# Patient Record
Sex: MALE | Race: WHITE | NOT HISPANIC OR LATINO | Employment: FULL TIME | URBAN - METROPOLITAN AREA
[De-identification: names, ages, dates, MRNs, and addresses within clinical notes are randomized per-mention and may not be internally consistent; named-entity substitution may affect disease eponyms.]

---

## 2017-01-24 ENCOUNTER — ANESTHESIA EVENT (OUTPATIENT)
Dept: ENDOSCOPY | Facility: HOSPITAL | Age: 52
End: 2017-01-24
Payer: COMMERCIAL

## 2017-01-24 ENCOUNTER — ANESTHESIA (OUTPATIENT)
Dept: ENDOSCOPY | Facility: HOSPITAL | Age: 52
End: 2017-01-24
Payer: COMMERCIAL

## 2017-01-24 VITALS — RESPIRATION RATE: 18 BRPM

## 2017-01-24 PROBLEM — Z13.9 SCREENING: Status: ACTIVE | Noted: 2017-01-24

## 2017-01-24 PROCEDURE — 25000003 PHARM REV CODE 250: Performed by: NURSE ANESTHETIST, CERTIFIED REGISTERED

## 2017-01-24 PROCEDURE — D9220A PRA ANESTHESIA: Mod: 33,ANES,, | Performed by: ANESTHESIOLOGY

## 2017-01-24 PROCEDURE — 63600175 PHARM REV CODE 636 W HCPCS: Performed by: NURSE ANESTHETIST, CERTIFIED REGISTERED

## 2017-01-24 PROCEDURE — D9220A PRA ANESTHESIA: Mod: 33,CRNA,, | Performed by: NURSE ANESTHETIST, CERTIFIED REGISTERED

## 2017-01-24 RX ORDER — PROPOFOL 10 MG/ML
VIAL (ML) INTRAVENOUS CONTINUOUS PRN
Status: DISCONTINUED | OUTPATIENT
Start: 2017-01-24 | End: 2017-01-24

## 2017-01-24 RX ORDER — LIDOCAINE HCL/PF 100 MG/5ML
SYRINGE (ML) INTRAVENOUS
Status: DISCONTINUED | OUTPATIENT
Start: 2017-01-24 | End: 2017-01-24

## 2017-01-24 RX ORDER — PROPOFOL 10 MG/ML
VIAL (ML) INTRAVENOUS
Status: DISCONTINUED | OUTPATIENT
Start: 2017-01-24 | End: 2017-01-24

## 2017-01-24 RX ADMIN — PROPOFOL 150 MCG/KG/MIN: 10 INJECTION, EMULSION INTRAVENOUS at 08:01

## 2017-01-24 RX ADMIN — PROPOFOL 100 MG: 10 INJECTION, EMULSION INTRAVENOUS at 08:01

## 2017-01-24 RX ADMIN — LIDOCAINE HYDROCHLORIDE 80 MG: 20 INJECTION, SOLUTION INTRAVENOUS at 08:01

## 2017-01-24 NOTE — ANESTHESIA PREPROCEDURE EVALUATION
01/24/2017  Kaleb Rendon is a 51 y.o., male.    [facility] Anesthesia Evaluation    I have reviewed the Patient Summary Reports.    I have reviewed the Nursing Notes.   I have reviewed the Medications.     Review of Systems  Anesthesia Hx:  No problems with previous Anesthesia  History of prior surgery of interest to airway management or planning: Previous anesthesia: General Denies Family Hx of Anesthesia complications.   Denies Personal Hx of Anesthesia complications.   Social:  Non-Smoker, Social Alcohol Use    Cardiovascular:   Exercise tolerance: good Hypertension        Physical Exam  General:  Well nourished    Airway/Jaw/Neck:  Airway Findings: Mouth Opening: Normal Tongue: Normal  General Airway Assessment: Adult  Mallampati: II  Improves to II with phonation.  TM Distance: Normal, at least 6 cm  Jaw/Neck Findings:  Neck ROM: Normal ROM      Dental:  Dental Findings: In tact   Chest/Lungs:  Chest/Lungs Findings: Clear to auscultation, Normal Respiratory Rate     Heart/Vascular:  Heart Findings: Rate: Normal  Rhythm: Regular Rhythm  Sounds: Normal        Mental Status:  Mental Status Findings:  Cooperative, Alert and Oriented         Anesthesia Plan  Type of Anesthesia, risks & benefits discussed:  Anesthesia Type:  general  Patient's Preference:   Intra-op Monitoring Plan:   Intra-op Monitoring Plan Comments:   Post Op Pain Control Plan:   Post Op Pain Control Plan Comments:   Induction:   IV  Beta Blocker:  Patient is not currently on a Beta-Blocker (No further documentation required).       Informed Consent: Patient understands risks and agrees with Anesthesia plan.  Questions answered. Anesthesia consent signed with patient.  ASA Score: 2     Day of Surgery Review of History & Physical:    H&P update referred to the surgeon.         Ready For Surgery From Anesthesia Perspective.

## 2017-01-24 NOTE — ANESTHESIA POSTPROCEDURE EVALUATION
"Anesthesia Post Evaluation    Patient: Kaleb Rendon    Procedure(s) Performed: Procedure(s) (LRB):  COLONOSCOPY (N/A)    Final Anesthesia Type: general  Patient location during evaluation: GI PACU  Patient participation: Yes- Able to Participate  Level of consciousness: awake and alert  Post-procedure vital signs: reviewed and stable  Pain management: adequate  Airway patency: patent  PONV status at discharge: No PONV  Anesthetic complications: no      Cardiovascular status: blood pressure returned to baseline  Respiratory status: unassisted  Hydration status: euvolemic  Follow-up not needed.        Visit Vitals    /74 (BP Location: Left arm, Patient Position: Lying, BP Method: Automatic)    Pulse (!) 57    Temp 36.5 °C (97.7 °F) (Axillary)    Resp 18    Ht 6' 1" (1.854 m)    Wt 90.7 kg (200 lb)    SpO2 96%    BMI 26.39 kg/m2       Pain/Natalie Score: Pain Assessment Performed: Yes (1/24/2017  9:43 AM)  Presence of Pain: denies (1/24/2017  9:43 AM)  Natalie Score: 10 (1/24/2017  9:43 AM)      "

## 2017-01-24 NOTE — TRANSFER OF CARE
"Anesthesia Transfer of Care Note    Patient: Kaleb Rendon    Procedure(s) Performed: Procedure(s) (LRB):  COLONOSCOPY (N/A)    Patient location: GI    Anesthesia Type: general    Transport from OR: Transported from OR on room air with adequate spontaneous ventilation    Post pain: adequate analgesia    Post assessment: no apparent anesthetic complications and tolerated procedure well    Post vital signs: stable    Level of consciousness: sedated    Nausea/Vomiting: no vomiting    Complications: none          Last vitals:   Visit Vitals    /88 (BP Location: Left arm, Patient Position: Lying, BP Method: Automatic)    Pulse 76    Temp 36.9 °C (98.4 °F) (Oral)    Resp 16    Ht 6' 1" (1.854 m)    Wt 90.7 kg (200 lb)    SpO2 97%    BMI 26.39 kg/m2     "

## 2017-01-24 NOTE — ANESTHESIA RELEASE NOTE
"Anesthesia Release from PACU Note    Patient: Kaleb Rendon    Procedure(s) Performed: Procedure(s) (LRB):  COLONOSCOPY (N/A)    Anesthesia type: general    Post pain: Adequate analgesia    Post assessment: no apparent anesthetic complications, tolerated procedure well and no evidence of recall    Last Vitals:   Visit Vitals    /74 (BP Location: Left arm, Patient Position: Lying, BP Method: Automatic)    Pulse (!) 57    Temp 36.5 °C (97.7 °F) (Axillary)    Resp 18    Ht 6' 1" (1.854 m)    Wt 90.7 kg (200 lb)    SpO2 96%    BMI 26.39 kg/m2       Post vital signs: stable    Level of consciousness: awake, alert  and oriented    Nausea/Vomiting: no nausea/no vomiting    Complications: none    Airway Patency: patent    Respiratory: unassisted    Cardiovascular: stable and blood pressure at baseline    Hydration: euvolemic  "

## 2018-03-13 DIAGNOSIS — Z00.00 ROUTINE GENERAL MEDICAL EXAMINATION AT A HEALTH CARE FACILITY: Primary | ICD-10-CM

## 2018-03-29 ENCOUNTER — CLINICAL SUPPORT (OUTPATIENT)
Dept: INTERNAL MEDICINE | Facility: CLINIC | Age: 53
End: 2018-03-29
Attending: INTERNAL MEDICINE

## 2018-03-29 ENCOUNTER — OFFICE VISIT (OUTPATIENT)
Dept: PULMONOLOGY | Facility: CLINIC | Age: 53
End: 2018-03-29

## 2018-03-29 ENCOUNTER — CLINICAL SUPPORT (OUTPATIENT)
Dept: INTERNAL MEDICINE | Facility: CLINIC | Age: 53
End: 2018-03-29

## 2018-03-29 ENCOUNTER — HOSPITAL ENCOUNTER (OUTPATIENT)
Dept: RADIOLOGY | Facility: HOSPITAL | Age: 53
Discharge: HOME | End: 2018-03-29
Attending: INTERNAL MEDICINE

## 2018-03-29 ENCOUNTER — HOSPITAL ENCOUNTER (OUTPATIENT)
Dept: CARDIOLOGY | Facility: CLINIC | Age: 53
Discharge: HOME | End: 2018-03-29
Attending: INTERNAL MEDICINE

## 2018-03-29 VITALS
WEIGHT: 207.38 LBS | HEART RATE: 76 BPM | HEIGHT: 73 IN | SYSTOLIC BLOOD PRESSURE: 132 MMHG | DIASTOLIC BLOOD PRESSURE: 77 MMHG | BODY MASS INDEX: 27.49 KG/M2

## 2018-03-29 DIAGNOSIS — Z00.00 ROUTINE GENERAL MEDICAL EXAMINATION AT A HEALTH CARE FACILITY: ICD-10-CM

## 2018-03-29 DIAGNOSIS — Z00.00 ANNUAL PHYSICAL EXAM: Primary | ICD-10-CM

## 2018-03-29 DIAGNOSIS — Z00.00 ROUTINE GENERAL MEDICAL EXAMINATION AT A HEALTH CARE FACILITY: Primary | ICD-10-CM

## 2018-03-29 LAB
ALBUMIN SERPL BCP-MCNC: 4.3 G/DL
ALP SERPL-CCNC: 77 U/L
ALT SERPL W/O P-5'-P-CCNC: 21 U/L
ANION GAP SERPL CALC-SCNC: 8 MMOL/L
AST SERPL-CCNC: 16 U/L
BILIRUB SERPL-MCNC: 0.8 MG/DL
BUN SERPL-MCNC: 11 MG/DL
CALCIUM SERPL-MCNC: 9.5 MG/DL
CHLORIDE SERPL-SCNC: 106 MMOL/L
CHOLEST SERPL-MCNC: 206 MG/DL
CHOLEST/HDLC SERPL: 6.6 {RATIO}
CO2 SERPL-SCNC: 25 MMOL/L
COMPLEXED PSA SERPL-MCNC: 0.99 NG/ML
CREAT SERPL-MCNC: 1.1 MG/DL
DIASTOLIC DYSFUNCTION: NO
ERYTHROCYTE [DISTWIDTH] IN BLOOD BY AUTOMATED COUNT: 13.4 %
EST. GFR  (AFRICAN AMERICAN): >60 ML/MIN/1.73 M^2
EST. GFR  (NON AFRICAN AMERICAN): >60 ML/MIN/1.73 M^2
ESTIMATED AVG GLUCOSE: 108 MG/DL
GLUCOSE SERPL-MCNC: 106 MG/DL
HBA1C MFR BLD HPLC: 5.4 %
HCT VFR BLD AUTO: 45.6 %
HCV AB SERPL QL IA: NEGATIVE
HDLC SERPL-MCNC: 31 MG/DL
HDLC SERPL: 15 %
HGB BLD-MCNC: 14.9 G/DL
LDLC SERPL CALC-MCNC: 129.8 MG/DL
MCH RBC QN AUTO: 29.2 PG
MCHC RBC AUTO-ENTMCNC: 32.7 G/DL
MCV RBC AUTO: 89 FL
NONHDLC SERPL-MCNC: 175 MG/DL
PLATELET # BLD AUTO: 305 K/UL
PMV BLD AUTO: 10.3 FL
POTASSIUM SERPL-SCNC: 4.7 MMOL/L
PROT SERPL-MCNC: 7.5 G/DL
RBC # BLD AUTO: 5.1 M/UL
SODIUM SERPL-SCNC: 139 MMOL/L
TRIGL SERPL-MCNC: 226 MG/DL
TSH SERPL DL<=0.005 MIU/L-ACNC: 2.54 UIU/ML
WBC # BLD AUTO: 5.45 K/UL

## 2018-03-29 PROCEDURE — 99386 PREV VISIT NEW AGE 40-64: CPT | Mod: S$GLB,,, | Performed by: INTERNAL MEDICINE

## 2018-03-29 PROCEDURE — 71046 X-RAY EXAM CHEST 2 VIEWS: CPT | Mod: TC,FY

## 2018-03-29 PROCEDURE — 97750 PHYSICAL PERFORMANCE TEST: CPT | Mod: S$GLB,,, | Performed by: INTERNAL MEDICINE

## 2018-03-29 PROCEDURE — 86803 HEPATITIS C AB TEST: CPT

## 2018-03-29 PROCEDURE — 99999 PR PBB SHADOW E&M-EST. PATIENT-LVL II: CPT | Mod: PBBFAC,,, | Performed by: INTERNAL MEDICINE

## 2018-03-29 PROCEDURE — 71046 X-RAY EXAM CHEST 2 VIEWS: CPT | Mod: 26,,, | Performed by: RADIOLOGY

## 2018-03-29 PROCEDURE — 83036 HEMOGLOBIN GLYCOSYLATED A1C: CPT

## 2018-03-29 PROCEDURE — 80053 COMPREHEN METABOLIC PANEL: CPT

## 2018-03-29 PROCEDURE — 85027 COMPLETE CBC AUTOMATED: CPT

## 2018-03-29 PROCEDURE — 80061 LIPID PANEL: CPT

## 2018-03-29 PROCEDURE — 84153 ASSAY OF PSA TOTAL: CPT

## 2018-03-29 PROCEDURE — 84443 ASSAY THYROID STIM HORMONE: CPT

## 2018-03-29 PROCEDURE — 97802 MEDICAL NUTRITION INDIV IN: CPT | Mod: S$GLB,,, | Performed by: INTERNAL MEDICINE

## 2018-03-29 PROCEDURE — 93015 CV STRESS TEST SUPVJ I&R: CPT | Mod: ,,, | Performed by: INTERNAL MEDICINE

## 2018-03-29 RX ORDER — AMLODIPINE AND BENAZEPRIL HYDROCHLORIDE 5; 10 MG/1; MG/1
1 CAPSULE ORAL DAILY
Refills: 11 | COMMUNITY
Start: 2018-02-09

## 2018-03-29 NOTE — PROGRESS NOTES
Subjective:       Patient ID: Kaleb Rendon is a 52 y.o. male.    Chief Complaint: No chief complaint on file.    HPI   Patient reported history of hypertension controlled with medication. Patient has reported no previous history of pulmonary disease.     Physical Limitations:   - None   - During curl-ups patient stated that he had a hernia 10 years ago and was uncomfortable completing the test. Patient stopped after 7 curl ups.    Current Exercise Routine:   - Runs 2 miles 3-4 days per week   - Light weights 3-4 days per week    No abdominal exercises performed because fear of another hernia. It was explained how important strengthening the core is. I recommended performing body weight abdominal exercises 2 days per week for 10-15 repetitions. I explained this should not cause another hernia.     Review of Systems    Objective:      The fitness evaluation results are as follows:    D.O.S. 3/29/2018   Height (in): 73   Weight (lbs): 206   BMI: 27.3545002   Body Fat (%): 31.22   Waist (cm): 102   Hip (cm): 107   WHR: 0.95   RBP (mmHg): 116/78   RHR (bpm): 64    Strength R (lbs)t: 89    Strength Lt (lbs): 91.6975424   Push-up Assessment: 10   Curl-up Assessment: 7   Flexibility Testing (cm): 43   REE (kcals): 1850     Physical Exam    Assessment:      Age/Gender Stratified Assessment:     Resting BP: Within Testing Limits   Body Fat %: Poor   WHR Risk Factor: Low Risk    Strength R: Below Average    Strength L: Average   Upper Body Endurance: Good   Abdominal Endurance: Well Below Average   Lower body Flexibility: Excellent     1. Routine general medical examination at a health care facility        Plan:    Patient should increase exercise routine to reach below guidelines and improve fitness level.    Recommended Fitness Guidelines:   - 150 minutes of moderate intensity aerobic exercise each week OR 75 minutes of vigorous    - 2-4 days per week of resistance training for each muscle group   -  Daily stretching

## 2018-03-29 NOTE — LETTER
March 29, 2018    Kaleb Rendon  [address] [id number]             [facility]  [facility]  [place] [address]  Phone: [phone] Dear Mr. Rendon:    Thank you for allowing me to serve you and perform your [facility] exam on 3/29/2018. This letter will serve as a brief summary of the physical findings and laboratory/studies performed and recommendations at this time.Except for your blood lipid panel, this is a normal exam. This can best be addressed with diet management.         If you have any questions or concerns, please don't hesitate to call.    Sincerely,        Harry Riley MD

## 2018-03-29 NOTE — PROGRESS NOTES
Nutrition Assessment  This is a general nutrition consultation as per the client provider's insurance provider.    Client name:  Kaleb Rendon  :  1965  Age:  52 y.o.  Gender:  male    FMH: Mother, grandmother and great grandmother - elevated triglycerides  Client states:  He has been here to  previously, however it has been some time ago. Had his blood work done 2 weeks ago and is aware that his Triglyceride level is high, HDL is low, LDL is high and Cholesterol was 200 which is a good number for him. Has a strong family history of elevated Triglycerides and wishes to stay away from Cholesterol medications. His wife is a Pharmacist and has educated him on the possible side effects and she has also done research on fish oil supplementation. He admits to have improved his food choices by limiting sweets and alcohol, however on the other hand his motivation to exercise is to be able to eat. He is honest and has plans for Good Friday to have thin fried Catfish at [facility] which has been a family tradition for years. Has a history of HTN and takes daily meds and is well controlled. A fitness center at his work is available, and he exercise 3/wk on the treadmill and although he presently does not have any joint issues, is interested in protecting them for the future, and his tried other machines. He dislikes the elliptical and StairMaster and pool is not available, so he has chosen the stationary bike to alternate with the treadmill. Today he is interested in learning what he can do to further lower his lipid numbers and to lose wt. By next visit.      Past Medical History:   Diagnosis Date    Colon polyp     Hypertension        Social History    Marital status:    Employment:  [employer]   Social History   Substance Use Topics    Smoking status: Never Smoker    Smokeless tobacco: Never Used    Alcohol use Yes      Comment: social         Current medications:  has a current  "medication list which includes the following prescription(s): amlodipine-benazepril 5-10 mg.  Vitamins, minerals, and/or supplements:  none   Food allergies or intolerances:  none     Food History  Less sweets, less ETOH, nuts 1x/wk  Bread or starch at each meal with added butter  Dislikes: all cold water fish, likes fried catfish or grilled gulf fish in butter    Exercise History:  3x/wk resistance training at Fitness center at work + 25 minutes on treadmill    Lab Reports   Total Cholesterol:  206    Triglycerides:  226  HDL:  31  LDL:  129.8   Glucose:  106  HbA1c:  5.4  BP:  116/78     Weight History  Height:  6'1"     Weight:  206  BMI:  27.24  % Body Fat:  31.22    Diagnosis  RMR (Method:  Body Boundary):  1850 kcal  Activity Factor:  1.3  JOSE L:  2405 - 500 = 1905    Altered nutrition laboratory values related to improper and imbalanced food choices as evidenced by Chol: 206 and Triglyceride: 226.    Intervention    Goals:  1.  Goal wt: 195#  2.  Fish oil supplement daily  3.  Cook with 1/2 butter 1/2 olive oil  4.  1/4 cup nuts daily  5.  25% improvement in Chol, Trig and LDL    Discussed lipid trending from last visit 7 years ago with today's values and all were improved. Explained differences and sources of saturated, mono and polyunsaturated fats via the "Choosing Heart Healthy Fat" handout. Provided and explained how to read label for trans and saturated fat per serving and daily intake of each that is recommended. Encouraged a 50% reduction in saturated fat by reducing butter in cooking  And adding olive oil and client agreed. Reviewed the advantages of nuts, and portion sizes of each. Also agrees to consume nuts daily. Explained the difference between Glucose and HAIC and also shows improvement. Introduced client to [facility] vitamins, ordering process and the ratio of EPA to DHA that is most beneficial. Encouraged a trial of Sunbutter and infused olive oils.    Handouts provided:  Meal Planning " "Guide  Restaurant Guide  Eat Fit Shopping List  Eat Fit [place]  Fast Food Guide  Vitamin/Mineral Guide  AND - "Choosing Heart Healthy Fats"    Monitoring/Evaluation    Monitor the following:  Weight  BMI  % Body Fat  Caloric intake  Labs:  Lipids    Follow Up Plan:  Follow up with client in 1-2 years  "

## 2018-03-29 NOTE — PROGRESS NOTES
Subjective:       Patient ID: Kaleb Rendon is a 52 y.o. male.    Chief Complaint: No chief complaint on file.    HPI  51 yo  at [employer] comes for his periodic health exam. His last visit was in 2012 and he has had no significant medical encounters in that time. Had a screening colonoscope in Jan. 2017, Family relative had colon cancer and he is screened every five years, Had large mouth diverticula otherwise normal. No medical complaints today. Exercises several times a week, jogs on a treadmill for 2 miles. No chest pain or discomfort.   Review of Systems   Constitutional: Negative.    HENT: Negative.    Eyes: Negative.    Respiratory: Negative.    Cardiovascular: Negative.    Gastrointestinal: Negative.         Family hx of colon cancer  Gets periodic  Screening colonoscopies.    Genitourinary: Negative.    Musculoskeletal: Negative.    Skin: Negative.    Neurological: Negative.    Psychiatric/Behavioral: Negative.    All other systems reviewed and are negative.      Objective:      Physical Exam   Constitutional: He is oriented to person, place, and time. He appears well-developed and well-nourished.   HENT:   Head: Normocephalic and atraumatic.   Right Ear: External ear normal.   Left Ear: External ear normal.   Eyes: Conjunctivae and EOM are normal. Pupils are equal, round, and reactive to light.   Neck: Normal range of motion. Neck supple.   Cardiovascular: Normal rate, regular rhythm and normal heart sounds.    Pulmonary/Chest: Effort normal and breath sounds normal.   Peak flow 600 l/min   Abdominal: Soft. Bowel sounds are normal.   Musculoskeletal: Normal range of motion.   Neurological: He is alert and oriented to person, place, and time. He has normal reflexes.   Skin: Skin is warm and dry.   Psychiatric: He has a normal mood and affect. His behavior is normal. Judgment and thought content normal.       Assessment:       No diagnosis found.    Plan:        Labs; Elevated triglycerides: 226 and  mild elevation of cholesterol: 206 with normal LDL, medication not indicated.  All other parameters are normal.  Chest x-ray is clear and Stress  EKG is negative for ischemia and he denies any chest discomfort with exertion. IMP: Type II-B hyperlipidemia to be controlled with diet.

## 2018-09-28 ENCOUNTER — TELEPHONE (OUTPATIENT)
Dept: SPORTS MEDICINE | Facility: CLINIC | Age: 53
End: 2018-09-28

## 2018-09-28 ENCOUNTER — OFFICE VISIT (OUTPATIENT)
Dept: SPORTS MEDICINE | Facility: CLINIC | Age: 53
End: 2018-09-28
Payer: COMMERCIAL

## 2018-09-28 ENCOUNTER — HOSPITAL ENCOUNTER (OUTPATIENT)
Dept: RADIOLOGY | Facility: HOSPITAL | Age: 53
Discharge: HOME | End: 2018-09-28
Attending: NEUROMUSCULOSKELETAL MEDICINE & OMM
Payer: COMMERCIAL

## 2018-09-28 VITALS — HEART RATE: 83 BPM | SYSTOLIC BLOOD PRESSURE: 118 MMHG | DIASTOLIC BLOOD PRESSURE: 75 MMHG

## 2018-09-28 DIAGNOSIS — M25.511 ACUTE PAIN OF RIGHT SHOULDER: Primary | ICD-10-CM

## 2018-09-28 DIAGNOSIS — M25.611 DECREASED ROM OF RIGHT SHOULDER: ICD-10-CM

## 2018-09-28 DIAGNOSIS — S46.001A UNSPECIFIED INJURY OF MUSCLE(S) AND TENDON(S) OF THE ROTATOR CUFF OF RIGHT SHOULDER, INITIAL ENCOUNTER: ICD-10-CM

## 2018-09-28 DIAGNOSIS — M25.511 RIGHT SHOULDER PAIN, UNSPECIFIED CHRONICITY: ICD-10-CM

## 2018-09-28 PROCEDURE — 99204 OFFICE O/P NEW MOD 45 MIN: CPT | Mod: S$GLB,,, | Performed by: NEUROMUSCULOSKELETAL MEDICINE & OMM

## 2018-09-28 PROCEDURE — 73030 X-RAY EXAM OF SHOULDER: CPT | Mod: TC,FY,PO,RT

## 2018-09-28 PROCEDURE — 3078F DIAST BP <80 MM HG: CPT | Mod: CPTII,S$GLB,, | Performed by: NEUROMUSCULOSKELETAL MEDICINE & OMM

## 2018-09-28 PROCEDURE — 3074F SYST BP LT 130 MM HG: CPT | Mod: CPTII,S$GLB,, | Performed by: NEUROMUSCULOSKELETAL MEDICINE & OMM

## 2018-09-28 PROCEDURE — 73030 X-RAY EXAM OF SHOULDER: CPT | Mod: 26,RT,, | Performed by: RADIOLOGY

## 2018-09-28 PROCEDURE — 99999 PR PBB SHADOW E&M-EST. PATIENT-LVL III: CPT | Mod: PBBFAC,,, | Performed by: NEUROMUSCULOSKELETAL MEDICINE & OMM

## 2018-09-28 NOTE — PROGRESS NOTES
Subjective:     Kaleb Rendon Jr.     Chief Complaint   Patient presents with    Right Shoulder - Pain       HPI    Kaleb is a 53 y.o. male coming in today for right shoulder pain that began 3 week(s) ago. Pt. describes the pain as a 4/10 dull pain that does not radiate. There was not a fall/injury/ or trauma associated with the onset of symptoms. He was starting his  when he felt immediate pain in his shoulder. Pt denies hearing/feeling a pop. The pain is better with rest and worse with reaching overhead, tucking in shirt, reaching. Pt notes loss of overhead range of motion. Pt. Denies any other musculoskeletal complaints at this time. Pt has a sedentary computer job, working as a .     Joint instability? no  Mechanical locking/clicking? no  Affecting ADL's? Yes-reaching overhead  Affecting sleep? No     Review of Systems   Constitutional: Negative for chills and fever.   HENT: Negative for hearing loss and tinnitus.    Eyes: Negative for blurred vision and photophobia.   Respiratory: Negative for cough and shortness of breath.    Cardiovascular: Negative for chest pain and leg swelling.   Gastrointestinal: Negative for abdominal pain, heartburn, nausea and vomiting.   Genitourinary: Negative for dysuria and hematuria.   Musculoskeletal: Positive for joint pain and myalgias. Negative for back pain, falls and neck pain.   Skin: Negative for rash.   Neurological: Negative for dizziness, tingling, focal weakness, weakness and headaches.   Endo/Heme/Allergies: Negative for environmental allergies. Does not bruise/bleed easily.   Psychiatric/Behavioral: Negative for depression. The patient is not nervous/anxious.        PAST MEDICAL HISTORY:   Past Medical History:   Diagnosis Date    Colon polyp     Hypertension      PAST SURGICAL HISTORY:   Past Surgical History:   Procedure Laterality Date    COLONOSCOPY      COLONOSCOPY N/A 1/24/2017    Procedure: COLONOSCOPY;  Surgeon: DIETER Goldsmith MD;   Location: [facility] (4TH FLR);  Service: Endoscopy;  Laterality: N/A;    COLONOSCOPY N/A 1/24/2017    Performed by DIETER Goldsmith MD at [facility] (4TH FLR)    COLONOSCOPY W/ POLYPECTOMY      HERNIA REPAIR       FAMILY HISTORY:   Family History   Problem Relation Age of Onset    Cancer Mother 65        colon cancer    Hyperlipidemia Mother     Diabetes Father     Glaucoma Father     Macular degeneration Father      SOCIAL HISTORY:   Social History     Socioeconomic History    Marital status:      Spouse name: Not on file    Number of children: Not on file    Years of education: Not on file    Highest education level: Not on file   Social Needs    Financial resource strain: Not on file    Food insecurity - worry: Not on file    Food insecurity - inability: Not on file    Transportation needs - medical: Not on file    Transportation needs - non-medical: Not on file   Occupational History    Not on file   Tobacco Use    Smoking status: Never Smoker    Smokeless tobacco: Never Used   Substance and Sexual Activity    Alcohol use: Yes     Comment: social     Drug use: No    Sexual activity: Yes     Partners: Female   Other Topics Concern    Not on file   Social History Narrative    Not on file       MEDICATIONS:   Current Outpatient Medications:     amlodipine-benazepril 5-10 mg (LOTREL) 5-10 mg per capsule, Take 1 capsule by mouth once daily., Disp: , Rfl: 11  ALLERGIES: Review of patient's allergies indicates:  No Known Allergies    Objective:     VITAL SIGNS: /75   Pulse 83    General    Nursing note and vitals reviewed.  Constitutional: He is oriented to person, place, and time. He appears well-developed and well-nourished.   HENT:   Head: Normocephalic and atraumatic.   no nasal discharge, no external ear redness or discharge   Eyes:   EOM is full and smooth  No eye redness or discharge   Neck: Neck supple. No tracheal deviation present.   Cardiovascular: Normal rate.    2+  Radial pulse bilaterally  2+ Dorsalis Pedis pulse bilaterally  No LE edema appreciated   Pulmonary/Chest: Effort normal. No respiratory distress.   Abdominal: He exhibits no distension.   No rigidity   Neurological: He is alert and oriented to person, place, and time. He exhibits normal muscle tone. Coordination normal.   See details below   Psychiatric: He has a normal mood and affect. His behavior is normal.             MUSCULOSKELETAL EXAM  Shoulder: right SHOULDER  The affected shoulder is compared to the contralateral shoulder.    Observation:    CERVICAL SPINE  Anterior head carriage. Increased thoracic kyphosis.      SHOULDER  No ecchymosis, edema, or erythema throughout the shoulder girdle.  No sternal, clavicular, or acromial deformities bilaterally.  No atrophy of the pectorals, deltoids, supraspinatus, infraspinatus, or biceps bilaterally.  Right shoulder slightly elevated 2/2 to muscular gaurding shoulders bilaterally.    ROM:  CERVICAL SPINE  Full AROM in flexion, extension, sidebending, and rotation without tenderness.    SHOULDER(* = with pain)  Active flexion to 180° on left and 130° on right*. Right Passive ROM met with pain at 140°  Active abduction to 180° on left and 90° on right *. Right Passive ROM met with muscle guarding and pain at 90°  Active internal rotation to T7 on left and L5 on right*.    Active external rotation to T4 on left and C7 on right*.    No scapular dyskinesia or winging.    Tenderness:  No tenderness at the AC joint  No tenderness over the clavicle   No tenderness over biceps tendon or bicipital groove  No tenderness over subacromial space    Strength Testing (* = with pain):  Deltoid - 5/5 on left and 4/5* on right  Biceps - 5/5 on left and 5/5 on right  Triceps - 5/5 on left and 5/5 on right  Wrist extension - 5/5 on left and 5/5 on right  Wrist flexion - 5/5 on left and 5/5 on right   - 5/5 on left and 5/5 on right  Finger extension - 5/5 on left and 5/5 on  right  Finger abduction - 5/5 on left and 5/5 on right  Scaption- 4/5* on right, 5/5 on left  IR- 4/5* on right, 5/5 on left  External rotation- 3/5* on right, 5/5 on left    Special Tests:  Empty can test - postive  Full can test - positive  Bear hug test - negative  Resisted internal rotation - positive  Resisted external rotation - positive    Neer's test - unable to perform 2/2 to limited forward flexion   Hawkin's-Cecilio test - positive  Cross-arm test- negative    OKeiths test - negative for deep pain     Sulcus sign - none  AP load and shift laxity - none    Speed's test - negative  Yergason's test - negative    Neurovascular Exam:  Spurlings test - negative bialterally  Lhermittes test - negative  2+ radial pulses bilaterally  Sensation intact to light touch in the distal median, radial, and ulnar nerve distributions bilaterally.  2+/4 reflexes at triceps, biceps, and brachioradialis  Capillary refill intact <2 seconds in all digits bilaterally    IMAGIN. X-ray ordered due to right shoulder. (AP, scapular Y, and axillary views) taken today.   2. X-ray images were reviewed personally by me and then directly with patient.  3. FINDINGS: X-ray images obtained demonstrate no cortical irregularities, sclerosis, osteophyte formation, or subchonral cysts. There is no joint space narrowing. No fractures or dislocations appreciated  4. IMPRESSION: No pathology or irregularities appreciated.     Assessment:      Encounter Diagnoses   Name Primary?    Acute pain of right shoulder Yes    Unspecified injury of muscle(s) and tendon(s) of the rotator cuff of right shoulder, initial encounter     Decreased ROM of right shoulder           Plan:     1. Concern for Right RTC tear on exam.  X-ray images of right shoulder. (AP, scapular Y, and axillary views) taken today. Images showed no abnormalities and images were personally reviewed with patient. MRI of R shoulder ordered for further evaluation.     2. HEP: Pt.  Given gentle Right shoulder ROM exercises (wall abduction and forward flexion, active internal and external rotation with arm by his side) to do 2-3 times a day - ending ROM at stretch, avoiding pain.     3. Pain control: OTC  ibuprofen prn, ice up to 20 minutes at a time following HEP    4. Follow-up following MRI for reevaluation and review of results.     5. Patient agreeable to today's plan and all questions were answered

## 2018-10-08 ENCOUNTER — HOSPITAL ENCOUNTER (OUTPATIENT)
Dept: RADIOLOGY | Facility: HOSPITAL | Age: 53
Discharge: HOME | End: 2018-10-08
Attending: NEUROMUSCULOSKELETAL MEDICINE & OMM
Payer: COMMERCIAL

## 2018-10-08 DIAGNOSIS — M25.511 ACUTE PAIN OF RIGHT SHOULDER: ICD-10-CM

## 2018-10-08 PROCEDURE — 73221 MRI JOINT UPR EXTREM W/O DYE: CPT | Mod: TC,RT

## 2018-10-08 PROCEDURE — 73221 MRI JOINT UPR EXTREM W/O DYE: CPT | Mod: 26,RT,, | Performed by: RADIOLOGY

## 2018-10-09 ENCOUNTER — OFFICE VISIT (OUTPATIENT)
Dept: SPORTS MEDICINE | Facility: CLINIC | Age: 53
End: 2018-10-09
Payer: COMMERCIAL

## 2018-10-09 VITALS
WEIGHT: 207.38 LBS | HEIGHT: 73 IN | BODY MASS INDEX: 27.49 KG/M2 | HEART RATE: 72 BPM | DIASTOLIC BLOOD PRESSURE: 89 MMHG | SYSTOLIC BLOOD PRESSURE: 138 MMHG

## 2018-10-09 DIAGNOSIS — M75.51 SUBACROMIAL BURSITIS OF RIGHT SHOULDER JOINT: ICD-10-CM

## 2018-10-09 DIAGNOSIS — M25.511 RIGHT SHOULDER PAIN, UNSPECIFIED CHRONICITY: Primary | ICD-10-CM

## 2018-10-09 DIAGNOSIS — M75.01 ADHESIVE CAPSULITIS OF RIGHT SHOULDER: ICD-10-CM

## 2018-10-09 DIAGNOSIS — M67.813 TENDINOSIS OF RIGHT SHOULDER: ICD-10-CM

## 2018-10-09 PROCEDURE — 99214 OFFICE O/P EST MOD 30 MIN: CPT | Mod: 25,S$GLB,, | Performed by: NEUROMUSCULOSKELETAL MEDICINE & OMM

## 2018-10-09 PROCEDURE — 3079F DIAST BP 80-89 MM HG: CPT | Mod: CPTII,S$GLB,, | Performed by: NEUROMUSCULOSKELETAL MEDICINE & OMM

## 2018-10-09 PROCEDURE — 3075F SYST BP GE 130 - 139MM HG: CPT | Mod: CPTII,S$GLB,, | Performed by: NEUROMUSCULOSKELETAL MEDICINE & OMM

## 2018-10-09 PROCEDURE — 20611 DRAIN/INJ JOINT/BURSA W/US: CPT | Mod: RT,S$GLB,, | Performed by: NEUROMUSCULOSKELETAL MEDICINE & OMM

## 2018-10-09 PROCEDURE — 3008F BODY MASS INDEX DOCD: CPT | Mod: CPTII,S$GLB,, | Performed by: NEUROMUSCULOSKELETAL MEDICINE & OMM

## 2018-10-09 PROCEDURE — 99999 PR PBB SHADOW E&M-EST. PATIENT-LVL III: CPT | Mod: PBBFAC,,, | Performed by: NEUROMUSCULOSKELETAL MEDICINE & OMM

## 2018-10-09 RX ORDER — TRIAMCINOLONE ACETONIDE 40 MG/ML
40 INJECTION, SUSPENSION INTRA-ARTICULAR; INTRAMUSCULAR
Status: COMPLETED | OUTPATIENT
Start: 2018-10-09 | End: 2018-10-09

## 2018-10-09 RX ADMIN — TRIAMCINOLONE ACETONIDE 40 MG: 40 INJECTION, SUSPENSION INTRA-ARTICULAR; INTRAMUSCULAR at 02:10

## 2018-10-09 NOTE — PROGRESS NOTES
"Subjective:     Kaleb Rendon Jr.    Chief Complaint   Patient presents with    Right Shoulder - Follow-up       HPI    Kaleb is a 53 y.o. male coming in today for right shoulder pain. He is here for his MRI results. Since last visit the pain has remained unchanged. Pt. describes the pain as a 0/10 at rest, 3-4/10 with certain movements sharp pain that does not radiate. There has not been any new a fall/injury/ or traumas since last visit.  He is still having difficulty with overhead motion and ROM stiffness. Pt. denies any new musculoskeletal complaints at this time.     Joint instability? no  Mechanical locking/clicking? no  Affecting ADL's? Yes- overhead activities  Affecting sleep? no    Review of Systems   Constitutional: Negative for chills and fever.   Musculoskeletal: Positive for joint pain. Negative for back pain, falls, myalgias and neck pain.   Neurological: Negative for dizziness, tingling, focal weakness, weakness and headaches.       PAST MEDICAL HISTORY:   Past Medical History:   Diagnosis Date    Colon polyp     Hypertension      PAST SURGICAL HISTORY:   Past Surgical History:   Procedure Laterality Date    COLONOSCOPY      COLONOSCOPY N/A 1/24/2017    Procedure: COLONOSCOPY;  Surgeon: DIETER Goldsmith MD;  Location: [facility] ([address] [place] [address]);  Service: Endoscopy;  Laterality: N/A;    COLONOSCOPY N/A 1/24/2017    Performed by DIETER Goldsmith MD at [facility] ([address] [place] [address])    COLONOSCOPY W/ POLYPECTOMY      HERNIA REPAIR         MEDICATIONS:   Current Outpatient Medications:     amlodipine-benazepril 5-10 mg (LOTREL) 5-10 mg per capsule, Take 1 capsule by mouth once daily., Disp: , Rfl: 11    Current Facility-Administered Medications:     triamcinolone acetonide injection 40 mg, 40 mg, Intra-articular, 1 time in Clinic/HOD, Morelia Truong DO  ALLERGIES: Review of patient's allergies indicates:  No Known Allergies      Objective:     VITAL SIGNS: /89   Pulse 72   Ht 6' 1" (1.854 m)   Wt " 94.1 kg (207 lb 6.4 oz)   BMI 27.36 kg/m²    General    Vitals reviewed.  Constitutional: He is oriented to person, place, and time. He appears well-developed and well-nourished.   Neurological: He is alert and oriented to person, place, and time.   Psychiatric: He has a normal mood and affect. His behavior is normal.               MUSCULOSKELETAL EXAM  Shoulder: right SHOULDER  The affected shoulder is compared to the contralateral shoulder.    Observation:    CERVICAL SPINE  Anterior head carriage. Increased thoracic kyphosis.    SHOULDER  No ecchymosis, edema, or erythema throughout the shoulder girdle.  No sternal, clavicular, or acromial deformities bilaterally.  No atrophy of the pectorals, deltoids, supraspinatus, infraspinatus, or biceps bilaterally.  Right shoulder slightly elevated 2/2 to muscular gaurding    ROM:  CERVICAL SPINE  Full AROM in flexion, extension, sidebending, and rotation without tenderness.     SHOULDER(* = with pain)  Active flexion to 180° on left and 130° on right*. Right Passive ROM met with resistance and pain at 140°  Active abduction to 180° on left and 90° on right *. Right Passive ROM met with resistance and pain at 100°  Active internal rotation to T7 on left and L5 on right*.    Active external rotation to T4 on left and C7 on right*.    No scapular dyskinesia or winging.     Tenderness:  No tenderness at the AC joint  No tenderness over the clavicle   No tenderness over biceps tendon or bicipital groove  No tenderness over subacromial space  + tenderness over RTC footprint at greater tubercle of humerus     Strength Testing (* = with pain):  Deltoid - 5/5 on left and 4/5* on right  Biceps - 5/5 on left and 5/5 on right  Triceps - 5/5 on left and 5/5 on right  Wrist extension - 5/5 on left and 5/5 on right  Wrist flexion - 5/5 on left and 5/5 on right   - 5/5 on left and 5/5 on right  Finger extension - 5/5 on left and 5/5 on right  Finger abduction - 5/5 on left and 5/5  on right  Scaption- 4/5* on right, 5/5 on left  IR- 4/5* on right, 5/5 on left  External rotation- 5/5 on right, 5/5 on left     Special Tests:  Empty can test - postive for pain  Full can test - positive for pain  Bear hug test - negative  Resisted internal rotation - positive  Resisted external rotation - negative     Neer's test - unable to perform 2/2 to limited forward flexion   Hawkin's-Cecilio test - positive  Cross-arm test- negative     OKeiths test - negative for deep pain      Sulcus sign - none  AP load and shift laxity - none     Speed's test - negative  Yergason's test - negative     Neurovascular Exam:  2+ radial pulses bilaterally  Sensation intact to light touch in the distal median, radial, and ulnar nerve distributions bilaterally.  Capillary refill intact <2 seconds in all digits bilaterally    IMAGIN. MRI ordered due to right RTC pain and weakness on last exam, taken on 10/8/18  2. MRI images were reviewed personally by me and then directly with patient.  3. FINDINGS: supraspinatus tendinosis with subacromial/subdeltoid bursitis. No signs of high grade cuff tear. Mild degenerative change at the rotator cuff footprint and AC joint. Glenohumeral articular cartilage preserves without any since of joint effusion.   4. IMPRESSION: supraspinatus tendinosis with subacromial/subdeltoid bursitis    Large Joint Aspiration/Injection  Subacromial bursa, right    Performed by: LOLLY SUERO  Authorized by: LOLLY SUERO.  Consent Done?: Yes (Verbal)  Indications: Pain  Site marked: The procedure site was marked   Timeout: Prior to procedure the correct patient, procedure, and site was verified     Location: Subacromial bursa, right  Prep: Prep: Patient was prepped with Chlorhexidine and alcohol.  Skin anesthetic: Ethyl Chloride spray was used prior to skin puncture.  Ultrasound guidance for needle placement: yes  Needle size: 20 G, 3.5  Approach: Posterior (patient left lateral  recumbent)  Procedure: After skin anesthetic was applied, the 20G, 3.5 needle was used to enter the subacromial bursa under US guidance. A 3 cc mixture of 1 cc of 40 mg/ml triamcinolone acetonide and 2 cc of 0.25% Bupivacaine Hydrochloride was injected into the bursa.   Medications: 40 mg triamcinolone acetonide 40 mg/mL  Patient tolerance: Patient tolerated the procedure well with no immediate complications    Ultrasound guidance was used for needle localization. Images were saved and stored for documentation. The shoulder structures were well visualized during the procedure. Direct visualization of the 20g x 3.5 needles was continuous throughout the procedures.    Triamcinolone:  NDC: [id number]  LOT: [id number]  EXP: 12/2019      Assessment:      Encounter Diagnoses   Name Primary?    Right shoulder pain, unspecified chronicity Yes    Subacromial bursitis of right shoulder joint     Tendinosis of right shoulder     Adhesive capsulitis of right shoulder           Plan:     1. Right shoulder supraspinatus tendinosis with subacromial bursitis shown on MRI. MRI images of right shoulder taken on 10/8/18 were personally reviewed with patient. Physical exam concerning for early stages of adhesive capsulitis as well. Patient consented to subacromial injection today (see procedure note above). Will start PT to increased ROM and stiffness.     2. Follow-up in 5-6 weeks for reevaluation, upon completion of PT.    3. Patient agreeable to today's plan and all questions were answered

## 2018-10-12 ENCOUNTER — CLINICAL SUPPORT (OUTPATIENT)
Dept: REHABILITATION | Facility: HOSPITAL | Age: 53
End: 2018-10-12
Payer: COMMERCIAL

## 2018-10-12 DIAGNOSIS — G89.29 CHRONIC RIGHT SHOULDER PAIN: ICD-10-CM

## 2018-10-12 DIAGNOSIS — M25.511 CHRONIC RIGHT SHOULDER PAIN: ICD-10-CM

## 2018-10-12 PROCEDURE — 97161 PT EVAL LOW COMPLEX 20 MIN: CPT

## 2018-10-12 PROCEDURE — 97110 THERAPEUTIC EXERCISES: CPT

## 2018-10-12 NOTE — PLAN OF CARE
[facility]  Physical Therapy Initial Evaluation    Name: Kaleb Rendon Jr.  Clinic Number: [id number]    Therapy Diagnosis:   Encounter Diagnosis   Name Primary?    Chronic right shoulder pain      Physician: Morelia Truong DO    Physician Orders: PT Eval and Treat Right Shoulder  Medical Diagnosis from Referral: M25.511 (ICD-10-CM) - Right shoulder pain, unspecified chronicity   Evaluation Date: 10/12/2018  Authorization Period Expiration: 10/9/19  Plan of Care Expiration: 11/23/18  Visit # / Visits authorized: 1/ 1    Time In: 11:00am  Time Out: 12:00pm  Total Billable Time: 50 minutes    Precautions: Standard    Subjective   Date of onset: 4-5 weeks  History of current condition - Kaleb reports: receiving injection on Tuesday. Pain is in right deltoid area. Difficulty with reaching overhead and behind back. Unable to sleep on right side.       Past Medical History:   Diagnosis Date    Colon polyp     Hypertension      Kaleb Rendon Jr.  has a past surgical history that includes Hernia repair; Colonoscopy; Colonoscopy w/ polypectomy; and COLONOSCOPY (N/A, 1/24/2017).    Kaleb has a current medication list which includes the following prescription(s): amlodipine-benazepril 5-10 mg.    Review of patient's allergies indicates:  No Known Allergies     Imaging, MRI studies: Supraspinatus tendinosis with subacromial/subdeltoid bursitis.  No evidence of high grade cuff tear.  Thickened, intermediate signal of the inferior glenohumeral ligament, overall nospecific.     Bone scan films:  FINDINGS:  No fracture or dislocation.  No bone destruction or soft tissue calcifications identified       Prior Therapy: None  Social History: lives in house lives with their spouse and lives with their son  Occupation: Works a desk job  Prior Level of Function: independent with ADL's, driving, working with no pain  Current Level of Function: has pain and difficulty with ADL's      Pain:  Current 0/10,  worst 5/10, best 0/10   Location: right shoulder   Description: Sharp and Shooting  Aggravating Factors: movement, reaching overhead and to the side  Easing Factors: rest    Pts goals: back to PLOF with no pain    Objective     Active Range of Motion: Measured in degrees    Elbow Right Left   Flexion WFL WFL   Extension WFL WFL   Pronation WFL WFL   Supination WFL WFL     Shoulder Right Left   Flexion 110, pain 180   Abduction 96, pain 180   ER T2, tight T6   IR R PSIS T10         Passive Range of Motion: Measured in degrees      Shoulder Right Left   Flexion 128, pain 180   Abduction 63, pain 180   ER at 0 10, pain 80   IR 26, pain 70   Unable to assess IR/ER at 90 degrees abduction    Upper Extremity Strength    Shoulder Right Left   Shoulder flexion: 4-/5 4+/5   Shoulder Abduction: 4-/5 4+/5   Shoulder ER 4-/5 4+/5   Shoulder IR 3+/5, pain 4+/5       Special Tests:   N/A    Scapular Control/Dyskinesis:    Normal / Subtle / Obvious  Comments    Left  normal N/A    Right  subtle N/A     Palpation Shoulder:  No significant findings    Mobility:  Right shoulder GH joint: hypomobile 2/6 with pain      CMS Impairment/Limitation/Restriction for FOTO Shoulder Survey    Therapist reviewed FOTO scores for Kaleb Rendon Jr. on 10/12/2018.   FOTO documents entered into [employer] - see Media section.    Limitation Score: 38%         TREATMENT   Treatment Time In: 11:40  Treatment Time Out: 11:50  Total Treatment time separate from Evaluation: 10 minutes    Kaleb received therapeutic exercises to develop strength, endurance, ROM, flexibility and posture for 10 minutes including:  Supine shoulder flexion with wand 2x15  Supine shoulder abduction with wand 2x15  Doorway stretch 2x30 seconds    Kaleb received cold pack for 10 minutes to right shoulder.    Home Exercises and Patient Education Provided    Education provided:   - HEP daily and ice as needed    Written Home Exercises Provided: yes.  Exercises were reviewed and Kaleb  was able to demonstrate them prior to the end of the session.  Kaleb demonstrated good  understanding of the education provided.     See EMR under Media for exercises provided 10/12/2018.    Assessment   Kaleb is a 53 y.o. male referred to outpatient Physical Therapy with a medical diagnosis of M25.511 (ICD-10-CM) - Right shoulder pain, unspecified chronicity. Patient demonstrates decreased right shoulder ROM/strength, decreased functional mobility, poor scapular kinesis and postural awareness, decreased activity tolerance, pain, difficulties with ADL's, and decreased knowledge of an appropriate HEP.    Pt prognosis is Excellent.   Pt will benefit from skilled outpatient Physical Therapy to address the deficits stated above and in the chart below, provide pt/family education, and to maximize pt's level of independence.     Plan of care discussed with patient: Yes  Pt's spiritual, cultural and educational needs considered and patient is agreeable to the plan of care and goals as stated below:     Anticipated Barriers for therapy: None    Medical Necessity is demonstrated by the following  History  Co-morbidities and personal factors that may impact the plan of care Co-morbidities:   HTN    Personal Factors:   no deficits     low   Examination  Body Structures and Functions, activity limitations and participation restrictions that may impact the plan of care Body Regions:   upper extremities    Body Systems:    ROM  strength    Participation Restrictions:   None    Activity limitations:   Learning and applying knowledge  no deficits    General Tasks and Commands  no deficits    Communication  no deficits    Mobility  lifting and carrying objects    Self care  washing oneself (bathing, drying, washing hands)  caring for body parts (brushing teeth, shaving, grooming)  dressing    Domestic Life  doing house work (cleaning house, washing dishes, laundry)    Interactions/Relationships  no deficits    Life Areas  no  deficits    Community and Social Life  no deficits         moderate   Clinical Presentation stable and uncomplicated low   Decision Making/ Complexity Score: low       Short Term GOALS: 3 weeks  1. Patient demonstrates independence with HEP.   2. Patient demonstrates increased right shoulder ROM to 150 degrees flexion, 100 degrees abduction, ER 40 degrees, and IR 50 degrees to improve tolerance to functional activities with no more than 2/10 pain.  3. Patient demonstrates improved overall function per FOTO Shoulder Survey to 20% Limitation or less.    Long Term GOALS: 6 weeks  1. Patient demonstrates increased right shoulder ROM to 180 degrees flexion/abduction, ER 80 degrees, and IR 70 degrees to improve tolerance to functional activities pain free.   2. Patient demonstrates increased strength BLE's to 5/5 or greater to improve tolerance to functional activities pain free.   3. Patient demonstrates improved overall function per FOTO Shoulder Survey to 10% Limitation or less.     Plan   Plan of care Certification: 10/12/2018 to 11/23/2018.    Outpatient Physical Therapy 2 times weekly for 6 weeks to include the following interventions: Manual Therapy, Moist Heat/ Ice, Neuromuscular Re-ed, Patient Education, Self Care, Therapeutic Activites and Therapeutic Exercise.     Ada Eddy, PT, DPT

## 2018-10-16 ENCOUNTER — CLINICAL SUPPORT (OUTPATIENT)
Dept: REHABILITATION | Facility: HOSPITAL | Age: 53
End: 2018-10-16
Payer: COMMERCIAL

## 2018-10-16 DIAGNOSIS — M25.511 CHRONIC RIGHT SHOULDER PAIN: ICD-10-CM

## 2018-10-16 DIAGNOSIS — G89.29 CHRONIC RIGHT SHOULDER PAIN: ICD-10-CM

## 2018-10-16 PROCEDURE — 97110 THERAPEUTIC EXERCISES: CPT

## 2018-10-16 PROCEDURE — 97140 MANUAL THERAPY 1/> REGIONS: CPT

## 2018-10-16 NOTE — PROGRESS NOTES
Physical Therapy Daily Treatment Note     Name: Kaleb Rendon Jr.  Clinic Number: [id number]    Therapy Diagnosis:   Encounter Diagnosis   Name Primary?    Chronic right shoulder pain      Physician: Morelia Truong DO    Visit Date: 10/16/2018    Physician Orders: PT Eval and Treat Right Shoulder  Note: Right shoulder ROM and RTC strengthening for R shoulder RTC tendinosis and bursitis. Early signs of adhesive capsulitis on exam.  Medical Diagnosis from Referral: M25.511 (ICD-10-CM) - Right shoulder pain, unspecified chronicity   Evaluation Date: 10/12/2018  Authorization Period Expiration: 10/9/19  Plan of Care Expiration: 11/23/18  Visit # / Visits authorized: 2/20    Time In: 9:00am  Time Out: 10:00am  Total Billable Time: 60 minutes    Precautions: Standard    Subjective     Pt reports: soreness following HEP stretches but no pain currently.  He was compliant with home exercise program.  Response to previous treatment: Soreness following last session  Functional change: N/A    Pain: 0/10  Location: right shoulder      Objective     Kaleb received therapeutic exercises to develop strength, endurance, ROM and posture for 45 minutes including:  UBE 5' forward/5' backward  Serratus punches with wand 2x15  Supine external rotation with wand 10x5 second hold  Standing rows OTB 2x15  Standing ER/IR OTB 2x10  Standing shoulder extension OTB 2x15  Standing IR stretch with towel 10x5 second  Standing extension with IR with wand 2x10    Kaleb received the following manual therapy techniques: Joint mobilizations, prolonged stretching were applied to the: Right shoulder for 10 minutes, including:  Posterior glides, distractions  Shoulder prolonged stretching/PROM flexion, abduction    Kaleb received cold pack for 10 minutes to right shoulder.      Home Exercises Provided and Patient Education Provided     Education provided:   - continue with HEP, ice as needed    Written Home Exercises Provided: Patient instructed  to cont prior HEP.  Exercises were reviewed and Kaleb was able to demonstrate them prior to the end of the session.  Kaleb demonstrated good  understanding of the education provided.     See EMR under Media for exercises provided prior visit.    Assessment     Patient demonstrated difficulty with strengthening exercises with muscle weakness and fatigue. Patient reported overall soreness throughout session that resolved with ice at the end of the session.  Kaleb is progressing well towards his goals.   Pt prognosis is Good.     Pt will continue to benefit from skilled outpatient physical therapy to address the deficits listed in the problem list box on initial evaluation, provide pt/family education and to maximize pt's level of independence in the home and community environment.     Pt's spiritual, cultural and educational needs considered and pt agreeable to plan of care and goals.    Anticipated barriers to physical therapy: None    Goals: Short Term GOALS: 3 weeks  1. Patient demonstrates independence with HEP. (ONGOING)  2. Patient demonstrates increased right shoulder ROM to 150 degrees flexion, 100 degrees abduction, ER 40 degrees, and IR 50 degrees to improve tolerance to functional activities with no more than 2/10 pain. (ONGOING)  3. Patient demonstrates improved overall function per FOTO Shoulder Survey to 20% Limitation or less. (ONGOING)     Long Term GOALS: 6 weeks  1. Patient demonstrates increased right shoulder ROM to 180 degrees flexion/abduction, ER 80 degrees, and IR 70 degrees to improve tolerance to functional activities pain free. (ONGOING)  2. Patient demonstrates increased strength BLE's to 5/5 or greater to improve tolerance to functional activities pain free. (ONGOING)  3. Patient demonstrates improved overall function per FOTO Shoulder Survey to 10% Limitation or less. (ONGOING)    Plan     Continue with POC, progress as tolerated    Ada Eddy, PT, DPT

## 2018-10-19 ENCOUNTER — CLINICAL SUPPORT (OUTPATIENT)
Dept: REHABILITATION | Facility: HOSPITAL | Age: 53
End: 2018-10-19
Payer: COMMERCIAL

## 2018-10-19 DIAGNOSIS — M25.511 ACUTE PAIN OF RIGHT SHOULDER: ICD-10-CM

## 2018-10-19 PROCEDURE — 97140 MANUAL THERAPY 1/> REGIONS: CPT

## 2018-10-19 PROCEDURE — 97110 THERAPEUTIC EXERCISES: CPT

## 2018-10-19 NOTE — PROGRESS NOTES
"  Physical Therapy Daily Treatment Note     Name: Kaleb Rendon Jr.  Clinic Number: [id number]    Therapy Diagnosis:   Encounter Diagnosis   Name Primary?    Acute pain of right shoulder      Physician: Morelia Truong DO    Visit Date: 10/19/2018    Physician Orders: PT Eval and Treat Right Shoulder  Note: Right shoulder ROM and RTC strengthening for R shoulder RTC tendinosis and bursitis. Early signs of adhesive capsulitis on exam.  Medical Diagnosis from Referral: M25.511 (ICD-10-CM) - Right shoulder pain, unspecified chronicity   Evaluation Date: 10/12/2018  Authorization Period Expiration: 10/9/19  Plan of Care Expiration: 11/23/18  Visit # / Visits authorized: 3/20    Time In: 1325  Time Out: 1425  Total Billable Time: 50 minutes    Precautions: Standard    Subjective     Pt reports: no pain in R shld when arrived for tx..  He was compliant with home exercise program and RICE  Response to previous treatment: no soreness after last tx  Functional change: improved use     Pain: 0/10  Location: right shoulder      Objective     Kaleb received therapeutic exercises to develop strength, endurance, ROM and posture for 35 minutes including:  UBE 4' forward/4' backward  Serratus punches with wand 2x15  Supine wand shld flex 2x15  Supine external rotation with wand 30x/3"  Standing rows OTB 2x15  Standing ER/IR OTB 2x10  Standing shoulder extension OTB 2x15  Standing IR wand 30x   Standing extension with IR with wand 2x10np    Kaleb received the following manual therapy techniques: Joint mobilizations, prolonged stretching were applied to the: Right shoulder for 15 minutes, including: Shld A/P glides, scapular mobs, and distraction.    Kaleb received cold pack for 10 minutes to right shoulder.      Home Exercises Provided and Patient Education Provided     Education provided:   - continue with HEP, ice as needed    Written Home Exercises Provided: Patient instructed to cont prior HEP.  Exercises were reviewed and " Kaleb was able to demonstrate them prior to the end of the session.  Kaleb demonstrated good  understanding of the education provided.     Assessment     Patient demonstrated difficulty with strengthening exercises with muscle weakness and fatigue. Patient reported overall soreness throughout session that resolved with ice at the end of the session.  Kaleb is progressing well towards his goals.   Pt prognosis is Good.     Pt will continue to benefit from skilled outpatient physical therapy to address the deficits listed in the problem list box on initial evaluation, provide pt/family education and to maximize pt's level of independence in the home and community environment.     Pt's spiritual, cultural and educational needs considered and pt agreeable to plan of care and goals.    Anticipated barriers to physical therapy: None    Goals: Short Term GOALS: 3 weeks  1. Patient demonstrates independence with HEP. (ONGOING)  2. Patient demonstrates increased right shoulder ROM to 150 degrees flexion, 100 degrees abduction, ER 40 degrees, and IR 50 degrees to improve tolerance to functional activities with no more than 2/10 pain. (ONGOING)  3. Patient demonstrates improved overall function per FOTO Shoulder Survey to 20% Limitation or less. (ONGOING)     Long Term GOALS: 6 weeks  1. Patient demonstrates increased right shoulder ROM to 180 degrees flexion/abduction, ER 80 degrees, and IR 70 degrees to improve tolerance to functional activities pain free. (ONGOING)  2. Patient demonstrates increased strength BLE's to 5/5 or greater to improve tolerance to functional activities pain free. (ONGOING)  3. Patient demonstrates improved overall function per FOTO Shoulder Survey to 10% Limitation or less. (ONGOING)    Plan     Continue with POC, progress as tolerated    Avinash Montelongo, PTA, STS

## 2018-10-23 ENCOUNTER — CLINICAL SUPPORT (OUTPATIENT)
Dept: REHABILITATION | Facility: HOSPITAL | Age: 53
End: 2018-10-23
Payer: COMMERCIAL

## 2018-10-23 DIAGNOSIS — M25.511 ACUTE PAIN OF RIGHT SHOULDER: ICD-10-CM

## 2018-10-23 PROCEDURE — 97110 THERAPEUTIC EXERCISES: CPT

## 2018-10-23 PROCEDURE — 97140 MANUAL THERAPY 1/> REGIONS: CPT

## 2018-10-23 NOTE — PROGRESS NOTES
Physical Therapy Daily Treatment Note     Name: Kaleb Rendon Jr.  Clinic Number: [id number]    Therapy Diagnosis:   Encounter Diagnosis   Name Primary?    Acute pain of right shoulder      Physician: Morelia Truong DO    Visit Date: 10/23/2018    Physician Orders: PT Eval and Treat Right Shoulder  Note: Right shoulder ROM and RTC strengthening for R shoulder RTC tendinosis and bursitis. Early signs of adhesive capsulitis on exam.  Medical Diagnosis from Referral: M25.511 (ICD-10-CM) - Right shoulder pain, unspecified chronicity   Evaluation Date: 10/12/2018  Authorization Period Expiration: 10/9/19  Plan of Care Expiration: 11/23/18  Visit # / Visits authorized: 4/20    Time In: 3:00pm  Time Out: 4:10pm  Total Billable Time: 55 minutes    Precautions: Standard    Subjective     Pt reports: no pain today just general soreness with movement. He states he has noticed an improvement in mobility.  He was compliant with home exercise program.  Response to previous treatment: Soreness following last session  Functional change: N/A    Pain: 0/10  Location: right shoulder      Objective     Kaleb received heat pack for 10 minutes to right shoulder prior to session.    Kaleb received therapeutic exercises to develop strength, endurance, ROM and posture for 45 minutes including:  UBE 5' forward/5' backward  Standing rows OTB 2x15  Standing ER/IR OTB 2x15  Standing shoulder extension OTB 2x15  Shoulder horizontal abduction (unable to maintain form, terminated)  Serratus punches with wand 2x15  Supine external rotation with wand 10x5 second hold  Wall walks flexion/abduction 10 second x 10    Standing IR stretch with towel 10x5 second - NP  Standing extension with IR with wand 2x10 - NP    Kaleb received the following manual therapy techniques: Joint mobilizations, prolonged stretching were applied to the: Right shoulder for 10 minutes, including:  Posterior glides, distractions  Shoulder prolonged stretching/PROM  flexion, abduction, IR/ER    Kaleb received cold pack for 10 minutes to right shoulder.      Home Exercises Provided and Patient Education Provided     Education provided:   - continue with HEP, ice as needed    Written Home Exercises Provided: Patient instructed to cont prior HEP.  Exercises were reviewed and Kaleb was able to demonstrate them prior to the end of the session.  Kaleb demonstrated good  understanding of the education provided.     See EMR under Media for exercises provided prior visit.    Assessment     Patient demonstrated difficulty with strengthening exercises with muscle weakness and fatigue. Patient reported overall soreness throughout session that resolved with ice at the end of the session.  Kaleb is progressing well towards his goals.   Pt prognosis is Good.     Pt will continue to benefit from skilled outpatient physical therapy to address the deficits listed in the problem list box on initial evaluation, provide pt/family education and to maximize pt's level of independence in the home and community environment.     Pt's spiritual, cultural and educational needs considered and pt agreeable to plan of care and goals.    Anticipated barriers to physical therapy: None    Goals: Short Term GOALS: 3 weeks  1. Patient demonstrates independence with HEP. (ONGOING)  2. Patient demonstrates increased right shoulder ROM to 150 degrees flexion, 100 degrees abduction, ER 40 degrees, and IR 50 degrees to improve tolerance to functional activities with no more than 2/10 pain. (ONGOING)  3. Patient demonstrates improved overall function per FOTO Shoulder Survey to 20% Limitation or less. (ONGOING)     Long Term GOALS: 6 weeks  1. Patient demonstrates increased right shoulder ROM to 180 degrees flexion/abduction, ER 80 degrees, and IR 70 degrees to improve tolerance to functional activities pain free. (ONGOING)  2. Patient demonstrates increased strength BLE's to 5/5 or greater to improve tolerance to  functional activities pain free. (ONGOING)  3. Patient demonstrates improved overall function per FOTO Shoulder Survey to 10% Limitation or less. (ONGOING)    Plan     Continue with POC, progress as tolerated    Ada Eddy, PT, DPT

## 2018-10-25 ENCOUNTER — CLINICAL SUPPORT (OUTPATIENT)
Dept: REHABILITATION | Facility: HOSPITAL | Age: 53
End: 2018-10-25
Payer: COMMERCIAL

## 2018-10-25 DIAGNOSIS — M25.511 ACUTE PAIN OF RIGHT SHOULDER: ICD-10-CM

## 2018-10-25 PROCEDURE — 97140 MANUAL THERAPY 1/> REGIONS: CPT

## 2018-10-25 PROCEDURE — 97110 THERAPEUTIC EXERCISES: CPT

## 2018-10-25 NOTE — PROGRESS NOTES
Physical Therapy Daily Treatment Note     Name: Kaleb Rendon Jr.  Clinic Number: [id number]    Therapy Diagnosis:   Encounter Diagnosis   Name Primary?    Acute pain of right shoulder      Physician: Morelia Truong DO    Visit Date: 10/25/2018    Physician Orders: PT Eval and Treat Right Shoulder  Note: Right shoulder ROM and RTC strengthening for R shoulder RTC tendinosis and bursitis. Early signs of adhesive capsulitis on exam.  Medical Diagnosis from Referral: M25.511 (ICD-10-CM) - Right shoulder pain, unspecified chronicity   Evaluation Date: 10/12/2018  Authorization Period Expiration: 10/9/19  Plan of Care Expiration: 11/23/18  Visit # / Visits authorized: 5/20    Time In: 11:00am  Time Out: 12:10pm  Total Billable Time: 55 minutes    Precautions: Standard    Subjective     Pt reports: no pain today just general soreness with movement. He states he has noticed an improvement in mobility.  He was compliant with home exercise program.  Response to previous treatment: Soreness following last session  Functional change: N/A    Pain: 0/10  Location: right shoulder      Objective     FOTO: 37% 10/25/2018    Kaleb received heat pack for 10 minutes to right shoulder prior to session.    Kaleb received therapeutic exercises to develop strength, endurance, ROM and posture for 45 minutes including:  UBE 3' forward/3' backward  Mal's flexion/abduction 5 second hold x 3 min each  Standing rows GTB 2x15  Standing ER/IR OTB 2x15  Standing shoulder extension GTB 2x15  Standing IR stretch with towel 10x5 second  Banded serratus wall slides with YTB 2x15    Serratus punches with wand 2x15 - NP  Supine external rotation with wand 10x5 second hold - NP  Wall walks flexion/abduction 10 second x 10 - NP  Standing extension with IR with wand 2x10 - NP    Kaleb received the following manual therapy techniques: Joint mobilizations, prolonged stretching were applied to the: Right shoulder for 10 minutes,  including:  Posterior glides, distractions  Shoulder prolonged stretching/PROM flexion, abduction, IR/ER    Kaleb received cold pack for 10 minutes to right shoulder.      Home Exercises Provided and Patient Education Provided     Education provided:   - continue with HEP, ice as needed    Written Home Exercises Provided: Patient instructed to cont prior HEP.  Exercises were reviewed and Kaleb was able to demonstrate them prior to the end of the session.  Kaleb demonstrated good  understanding of the education provided.     See EMR under Media for exercises provided prior visit.    Assessment     Patient tolerated increase in exercise but required cues to correct form.  Kaleb is progressing well towards his goals.   Pt prognosis is Good.     Pt will continue to benefit from skilled outpatient physical therapy to address the deficits listed in the problem list box on initial evaluation, provide pt/family education and to maximize pt's level of independence in the home and community environment.     Pt's spiritual, cultural and educational needs considered and pt agreeable to plan of care and goals.    Anticipated barriers to physical therapy: None    Goals: Short Term GOALS: 3 weeks  1. Patient demonstrates independence with HEP. (ONGOING)  2. Patient demonstrates increased right shoulder ROM to 150 degrees flexion, 100 degrees abduction, ER 40 degrees, and IR 50 degrees to improve tolerance to functional activities with no more than 2/10 pain. (ONGOING)  3. Patient demonstrates improved overall function per FOTO Shoulder Survey to 20% Limitation or less. (ONGOING)     Long Term GOALS: 6 weeks  1. Patient demonstrates increased right shoulder ROM to 180 degrees flexion/abduction, ER 80 degrees, and IR 70 degrees to improve tolerance to functional activities pain free. (ONGOING)  2. Patient demonstrates increased strength BLE's to 5/5 or greater to improve tolerance to functional activities pain free.  (ONGOING)  3. Patient demonstrates improved overall function per FOTO Shoulder Survey to 10% Limitation or less. (ONGOING)    Plan     Continue with POC, progress as tolerated    Ada Eddy, PT, DPT

## 2018-10-29 ENCOUNTER — CLINICAL SUPPORT (OUTPATIENT)
Dept: REHABILITATION | Facility: HOSPITAL | Age: 53
End: 2018-10-29
Payer: COMMERCIAL

## 2018-10-29 DIAGNOSIS — M25.511 ACUTE PAIN OF RIGHT SHOULDER: ICD-10-CM

## 2018-10-29 PROCEDURE — 97140 MANUAL THERAPY 1/> REGIONS: CPT

## 2018-10-29 PROCEDURE — 97110 THERAPEUTIC EXERCISES: CPT

## 2018-10-29 NOTE — PROGRESS NOTES
Physical Therapy Daily Treatment Note     Name: Kaleb Rendon Jr.  Clinic Number: [id number]    Therapy Diagnosis:   Encounter Diagnosis   Name Primary?    Acute pain of right shoulder      Physician: Morelia Truong DO    Visit Date: 10/29/2018    Physician Orders: PT Eval and Treat Right Shoulder  Note: Right shoulder ROM and RTC strengthening for R shoulder RTC tendinosis and bursitis. Early signs of adhesive capsulitis on exam.  Medical Diagnosis from Referral: M25.511 (ICD-10-CM) - Right shoulder pain, unspecified chronicity   Evaluation Date: 10/12/2018  Authorization Period Expiration: 10/9/19  Plan of Care Expiration: 11/23/18  Visit # / Visits authorized: 6/20    Time In: 11:00am  Time Out: 12:05pm  Total Billable Time: 45 minutes    Precautions: Standard    Subjective     Pt reports: no pain today just general soreness with movement. He states he has noticed an improvement in mobility.  He was compliant with home exercise program.  Response to previous treatment: Soreness following last session  Functional change: N/A    Pain: 0/10  Location: right shoulder      Objective     Kaleb received heat pack for 10 minutes to right shoulder prior to session.    Kaleb received therapeutic exercises to develop strength, endurance, ROM and posture for 35 minutes including:  UBE 3' forward/3' backward  Mal's flexion/abduction/IR 5 second hold x 3 min each  Standing rows GTB 3x15  Standing ER/IR OTB 3x15  Standing shoulder extension GTB 3x15  Serratus punches with wand 3# 2x20    Standing IR stretch with towel 10x5 second - NP  Banded serratus wall slides with YTB 2x15 - NP  Supine external rotation with wand 10x5 second hold - NP  Wall walks flexion/abduction 10 second x 10 - NP  Standing extension with IR with wand 2x10 - NP    Kaleb received the following manual therapy techniques: Joint mobilizations, prolonged stretching were applied to the: Right shoulder for 10 minutes, including:  Posterior glides,  distractions  Shoulder prolonged stretching/PROM flexion, abduction, IR/ER    Kaleb received cold pack for 10 minutes to right shoulder.      Home Exercises Provided and Patient Education Provided     Education provided:   - continue with HEP, ice as needed    Written Home Exercises Provided: Patient instructed to cont prior HEP.  Exercises were reviewed and Kaleb was able to demonstrate them prior to the end of the session.  Kaleb demonstrated good  understanding of the education provided.     See EMR under Media for exercises provided prior visit.    Assessment     Patient tolerated increase in exercise but required cues to correct form.  Kaleb is progressing well towards his goals.   Pt prognosis is Good.     Pt will continue to benefit from skilled outpatient physical therapy to address the deficits listed in the problem list box on initial evaluation, provide pt/family education and to maximize pt's level of independence in the home and community environment.     Pt's spiritual, cultural and educational needs considered and pt agreeable to plan of care and goals.    Anticipated barriers to physical therapy: None    Goals: Short Term GOALS: 3 weeks  1. Patient demonstrates independence with HEP. (ONGOING)  2. Patient demonstrates increased right shoulder ROM to 150 degrees flexion, 100 degrees abduction, ER 40 degrees, and IR 50 degrees to improve tolerance to functional activities with no more than 2/10 pain. (ONGOING)  3. Patient demonstrates improved overall function per FOTO Shoulder Survey to 20% Limitation or less. (ONGOING)     Long Term GOALS: 6 weeks  1. Patient demonstrates increased right shoulder ROM to 180 degrees flexion/abduction, ER 80 degrees, and IR 70 degrees to improve tolerance to functional activities pain free. (ONGOING)  2. Patient demonstrates increased strength BLE's to 5/5 or greater to improve tolerance to functional activities pain free. (ONGOING)  3. Patient demonstrates  improved overall function per FOTO Shoulder Survey to 10% Limitation or less. (ONGOING)    Plan     Continue with POC, progress as tolerated    Ada Eddy, PT, DPT

## 2018-11-02 ENCOUNTER — CLINICAL SUPPORT (OUTPATIENT)
Dept: REHABILITATION | Facility: HOSPITAL | Age: 53
End: 2018-11-02
Payer: COMMERCIAL

## 2018-11-02 DIAGNOSIS — M25.511 ACUTE PAIN OF RIGHT SHOULDER: ICD-10-CM

## 2018-11-02 PROCEDURE — 97140 MANUAL THERAPY 1/> REGIONS: CPT

## 2018-11-02 PROCEDURE — 97110 THERAPEUTIC EXERCISES: CPT

## 2018-11-02 NOTE — PROGRESS NOTES
Physical Therapy Daily Treatment Note     Name: Kaleb Rendon Jr.  Clinic Number: [id number]    Therapy Diagnosis:   Encounter Diagnosis   Name Primary?    Acute pain of right shoulder      Physician: Morelia Truong DO    Visit Date: 11/2/2018    Physician Orders: PT Eval and Treat Right Shoulder  Note: Right shoulder ROM and RTC strengthening for R shoulder RTC tendinosis and bursitis. Early signs of adhesive capsulitis on exam.  Medical Diagnosis from Referral: M25.511 (ICD-10-CM) - Right shoulder pain, unspecified chronicity   Evaluation Date: 10/12/2018  Authorization Period Expiration: 10/9/19  Plan of Care Expiration: 11/23/18  Visit # / Visits authorized: 7/20    Time In: 10:55am  Time Out: 12:05pm  Total Billable Time: 50 minutes    Precautions: Standard    Subjective     Pt reports: no pain today or following last session. He reports falling asleep on his right shoulder the other night and it was sore the next day.  He was compliant with home exercise program.  Response to previous treatment: No complaints  Functional change: N/A    Pain: 0/10  Location: right shoulder      Objective     Kaleb received heat pack for 10 minutes to right shoulder prior to session.    Kaleb received therapeutic exercises to develop strength, endurance, ROM and posture for 35 minutes including:  UBE 3' forward/3' backward  Mal's flexion/abduction/IR 5 second hold x 3 min each  Standing rows GTB 3x15  Standing ER/IR step-outs GTB 3x15  Standing shoulder extension GTB 3x15  Serratus punches with wand 3# 2x30  Ball bounces red ball 2x60 seconds  Supine shoulder flexion 3# 2x30    Standing IR stretch with towel 10x5 second - NP  Banded serratus wall slides with YTB 2x15 - NP  Supine external rotation with wand 10x5 second hold - NP  Wall walks flexion/abduction 10 second x 10 - NP  Standing extension with IR with wand 2x10 - NP    Kaleb received the following manual therapy techniques: Joint mobilizations, prolonged  stretching were applied to the: Right shoulder for 10 minutes, including:  Posterior glides, distractions  Shoulder prolonged stretching/PROM flexion, abduction, IR/ER    Kaleb received cold pack for 10 minutes to right shoulder.      Home Exercises Provided and Patient Education Provided     Education provided:   - continue with HEP, ice as needed    Written Home Exercises Provided: Patient instructed to cont prior HEP.  Exercises were reviewed and Kaleb was able to demonstrate them prior to the end of the session.  Kaleb demonstrated good  understanding of the education provided.     See EMR under Media for exercises provided prior visit.    Assessment     Patient demonstrating improved ROM with exercises and PROM.  Kaleb is progressing well towards his goals.   Pt prognosis is Good.     Pt will continue to benefit from skilled outpatient physical therapy to address the deficits listed in the problem list box on initial evaluation, provide pt/family education and to maximize pt's level of independence in the home and community environment.     Pt's spiritual, cultural and educational needs considered and pt agreeable to plan of care and goals.    Anticipated barriers to physical therapy: None    Goals: Short Term GOALS: 3 weeks  1. Patient demonstrates independence with HEP. (ONGOING)  2. Patient demonstrates increased right shoulder ROM to 150 degrees flexion, 100 degrees abduction, ER 40 degrees, and IR 50 degrees to improve tolerance to functional activities with no more than 2/10 pain. (ONGOING)  3. Patient demonstrates improved overall function per FOTO Shoulder Survey to 20% Limitation or less. (ONGOING)     Long Term GOALS: 6 weeks  1. Patient demonstrates increased right shoulder ROM to 180 degrees flexion/abduction, ER 80 degrees, and IR 70 degrees to improve tolerance to functional activities pain free. (ONGOING)  2. Patient demonstrates increased strength BLE's to 5/5 or greater to improve tolerance  to functional activities pain free. (ONGOING)  3. Patient demonstrates improved overall function per FOTO Shoulder Survey to 10% Limitation or less. (ONGOING)    Plan     Continue with POC, progress as tolerated    Ada Eddy, PT, DPT

## 2018-11-05 ENCOUNTER — CLINICAL SUPPORT (OUTPATIENT)
Dept: REHABILITATION | Facility: HOSPITAL | Age: 53
End: 2018-11-05
Payer: COMMERCIAL

## 2018-11-05 DIAGNOSIS — M25.511 ACUTE PAIN OF RIGHT SHOULDER: ICD-10-CM

## 2018-11-05 PROCEDURE — 97140 MANUAL THERAPY 1/> REGIONS: CPT

## 2018-11-05 PROCEDURE — 97110 THERAPEUTIC EXERCISES: CPT

## 2018-11-05 NOTE — PROGRESS NOTES
Physical Therapy Daily Treatment Note     Name: Kaleb Rendon Jr.  Clinic Number: [id number]    Therapy Diagnosis:   Encounter Diagnosis   Name Primary?    Acute pain of right shoulder      Physician: Morelia Truong DO    Visit Date: 11/5/2018    Physician Orders: PT Eval and Treat Right Shoulder  Note: Right shoulder ROM and RTC strengthening for R shoulder RTC tendinosis and bursitis. Early signs of adhesive capsulitis on exam.  Medical Diagnosis from Referral: M25.511 (ICD-10-CM) - Right shoulder pain, unspecified chronicity   Evaluation Date: 10/12/2018  Authorization Period Expiration: 10/9/19  Plan of Care Expiration: 11/23/18  Visit # / Visits authorized: 8/20    Time In: 11:00am  Time Out: 12:05pm  Total Billable Time: 45 minutes    Precautions: Standard    Subjective     Pt reports: no pain today or following last session.  He was compliant with home exercise program.  Response to previous treatment: No complaints  Functional change: N/A    Pain: 0/10  Location: right shoulder      Objective     Kaleb received heat pack for 10 minutes to right shoulder prior to session.    Kaleb received therapeutic exercises to develop strength, endurance, ROM and posture for 35 minutes including:  UBE 3' forward/3' backward  Mal's flexion/abduction/IR x 3 min each  Standing rows GTB 3x15  Standing ER/IR step-outs GTB 3x15  Standing shoulder extension GTB 3x15  Standing banded shoulder flexion 2# 2x15    Serratus punches with wand 3# 2x30 - NP  Ball bounces red ball 2x60 seconds - NP  Supine shoulder flexion 3# 2x30 - NP  Standing IR stretch with towel 10x5 second - NP  Banded serratus wall slides with YTB 2x15 - NP  Supine external rotation with wand 10x5 second hold - NP  Wall walks flexion/abduction 10 second x 10 - NP  Standing extension with IR with wand 2x10 - NP    Kaleb received the following manual therapy techniques: Joint mobilizations, prolonged stretching were applied to the: Right shoulder for 10  minutes, including:  Posterior glides, distractions  Shoulder prolonged stretching/PROM flexion, abduction, IR/ER    Kaleb received cold pack for 10 minutes to right shoulder.      Home Exercises Provided and Patient Education Provided     Education provided:   - continue with HEP, ice as needed    Written Home Exercises Provided: Patient instructed to cont prior HEP.  Exercises were reviewed and Kaleb was able to demonstrate them prior to the end of the session.  Kaleb demonstrated good  understanding of the education provided.     See EMR under Media for exercises provided prior visit.    Assessment     Patient demonstrating improved ROM with exercises and PROM. No complaints following session.  Kaleb is progressing well towards his goals.   Pt prognosis is Good.     Pt will continue to benefit from skilled outpatient physical therapy to address the deficits listed in the problem list box on initial evaluation, provide pt/family education and to maximize pt's level of independence in the home and community environment.     Pt's spiritual, cultural and educational needs considered and pt agreeable to plan of care and goals.    Anticipated barriers to physical therapy: None    Goals: Short Term GOALS: 3 weeks  1. Patient demonstrates independence with HEP. (ONGOING)  2. Patient demonstrates increased right shoulder ROM to 150 degrees flexion, 100 degrees abduction, ER 40 degrees, and IR 50 degrees to improve tolerance to functional activities with no more than 2/10 pain. (ONGOING)  3. Patient demonstrates improved overall function per FOTO Shoulder Survey to 20% Limitation or less. (ONGOING)     Long Term GOALS: 6 weeks  1. Patient demonstrates increased right shoulder ROM to 180 degrees flexion/abduction, ER 80 degrees, and IR 70 degrees to improve tolerance to functional activities pain free. (ONGOING)  2. Patient demonstrates increased strength BLE's to 5/5 or greater to improve tolerance to functional  activities pain free. (ONGOING)  3. Patient demonstrates improved overall function per FOTO Shoulder Survey to 10% Limitation or less. (ONGOING)    Plan     Continue with POC, progress as tolerated    Ada Eddy, PT, DPT

## 2018-11-07 ENCOUNTER — CLINICAL SUPPORT (OUTPATIENT)
Dept: REHABILITATION | Facility: HOSPITAL | Age: 53
End: 2018-11-07
Payer: COMMERCIAL

## 2018-11-07 DIAGNOSIS — M25.511 ACUTE PAIN OF RIGHT SHOULDER: ICD-10-CM

## 2018-11-07 PROCEDURE — 97140 MANUAL THERAPY 1/> REGIONS: CPT

## 2018-11-07 PROCEDURE — 97110 THERAPEUTIC EXERCISES: CPT

## 2018-11-07 NOTE — PROGRESS NOTES
Physical Therapy Daily Treatment Note     Name: Kaleb Rendon Jr.  Clinic Number: [id number]    Therapy Diagnosis:   Encounter Diagnosis   Name Primary?    Acute pain of right shoulder      Physician: Morelia Truong DO    Visit Date: 11/7/2018    Physician Orders: PT Eval and Treat Right Shoulder  Note: Right shoulder ROM and RTC strengthening for R shoulder RTC tendinosis and bursitis. Early signs of adhesive capsulitis on exam.  Medical Diagnosis from Referral: M25.511 (ICD-10-CM) - Right shoulder pain, unspecified chronicity   Evaluation Date: 10/12/2018  Authorization Period Expiration: 10/9/19  Plan of Care Expiration: 11/23/18  Visit # / Visits authorized: 9/20    Time In: 1:01pm  Time Out: 2:10pm  Total Billable Time: 50 minutes    Precautions: Standard    Subjective     Pt reports: no pain today or following last session.  He was compliant with home exercise program.  Response to previous treatment: No complaints  Functional change: N/A    Pain: 0/10  Location: right shoulder      Objective     Kaleb received heat pack for 10 minutes to right shoulder prior to session.    Kaleb received therapeutic exercises to develop strength, endurance, ROM and posture for 40 minutes including:  UBE 3' forward/3' backward level 3  Mal's flexion/abduction/IR x 3 min each  Standing rows BTB 3x15  Standing ER/IR step-outs BTB 3x15  Standing shoulder extension BTB 3x15  Body blade 90 degrees flexion/abduction x 30 seconds each  Frontal/lateral raise 3# 2x15    Standing banded shoulder flexion 2# 2x15 - NP  Serratus punches with wand 3# 2x30 - NP  Ball bounces red ball 2x60 seconds - NP  Supine shoulder flexion 3# 2x30 - NP  Standing IR stretch with towel 10x5 second - NP  Banded serratus wall slides with YTB 2x15 - NP  Supine external rotation with wand 10x5 second hold - NP  Wall walks flexion/abduction 10 second x 10 - NP  Standing extension with IR with wand 2x10 - NP    Kaleb received the following manual  therapy techniques: Joint mobilizations, prolonged stretching were applied to the: Right shoulder for 10 minutes, including:  Posterior glides, distractions  Shoulder prolonged stretching/PROM flexion, abduction, IR/ER    Kaleb received cold pack for 10 minutes to right shoulder.      Home Exercises Provided and Patient Education Provided     Education provided:   - continue with HEP, ice as needed    Written Home Exercises Provided: Patient instructed to cont prior HEP.  Exercises were reviewed and Kaleb was able to demonstrate them prior to the end of the session.  Kaleb demonstrated good  understanding of the education provided.     See EMR under Media for exercises provided prior visit.    Assessment     Patient demonstrated improved PROM and tolerated increase in exercises today with no reports of pain.    Kaleb is progressing well towards his goals.   Pt prognosis is Good.     Pt will continue to benefit from skilled outpatient physical therapy to address the deficits listed in the problem list box on initial evaluation, provide pt/family education and to maximize pt's level of independence in the home and community environment.     Pt's spiritual, cultural and educational needs considered and pt agreeable to plan of care and goals.    Anticipated barriers to physical therapy: None    Goals: Short Term GOALS: 3 weeks  1. Patient demonstrates independence with HEP. (ONGOING)  2. Patient demonstrates increased right shoulder ROM to 150 degrees flexion, 100 degrees abduction, ER 40 degrees, and IR 50 degrees to improve tolerance to functional activities with no more than 2/10 pain. (ONGOING)  3. Patient demonstrates improved overall function per FOTO Shoulder Survey to 20% Limitation or less. (ONGOING)     Long Term GOALS: 6 weeks  1. Patient demonstrates increased right shoulder ROM to 180 degrees flexion/abduction, ER 80 degrees, and IR 70 degrees to improve tolerance to functional activities pain free.  (ONGOING)  2. Patient demonstrates increased strength BLE's to 5/5 or greater to improve tolerance to functional activities pain free. (ONGOING)  3. Patient demonstrates improved overall function per FOTO Shoulder Survey to 10% Limitation or less. (ONGOING)    Plan     Continue with POC, progress as tolerated    Ada Eddy, PT, DPT

## 2018-11-13 ENCOUNTER — CLINICAL SUPPORT (OUTPATIENT)
Dept: REHABILITATION | Facility: HOSPITAL | Age: 53
End: 2018-11-13
Payer: COMMERCIAL

## 2018-11-13 DIAGNOSIS — M25.511 ACUTE PAIN OF RIGHT SHOULDER: ICD-10-CM

## 2018-11-13 PROCEDURE — 97110 THERAPEUTIC EXERCISES: CPT

## 2018-11-13 PROCEDURE — 97140 MANUAL THERAPY 1/> REGIONS: CPT

## 2018-11-13 NOTE — PROGRESS NOTES
Physical Therapy Daily Treatment Note     Name: Kaleb Rendon Jr.  Clinic Number: [id number]    Therapy Diagnosis:   Encounter Diagnosis   Name Primary?    Acute pain of right shoulder      Physician: Morelia Truong DO    Visit Date: 11/13/2018    Physician Orders: PT Eval and Treat Right Shoulder  Note: Right shoulder ROM and RTC strengthening for R shoulder RTC tendinosis and bursitis. Early signs of adhesive capsulitis on exam.  Medical Diagnosis from Referral: M25.511 (ICD-10-CM) - Right shoulder pain, unspecified chronicity   Evaluation Date: 10/12/2018  Authorization Period Expiration: 10/9/19  Plan of Care Expiration: 11/23/18  Visit # / Visits authorized: 10/20    Time In: 2:05pm  Time Out: 3:20pm  Total Billable Time: 55 minutes    Precautions: Standard    Subjective     Pt reports: no pain today or following last session.  He was compliant with home exercise program.  Response to previous treatment: No complaints  Functional change: N/A    Pain: 0/10  Location: right shoulder      Objective     Shoulder Right Left   Flexion 135, pain 180   Abduction 140, pain 180   ER T3 T6   IR L4, pain T10     Shoulder Right Left   Flexion 165, pain 180   Abduction 170, pain 180   ER at 90 55, pain 80   IR at 90 55, pain 70       FOTO: 18% on 11/13/2018      Kaleb received heat pack for 10 minutes to right shoulder prior to session.    Kaleb received therapeutic exercises to develop strength, endurance, ROM and posture for 40 minutes including:  UBE 3' forward/3' backward level 3  Mal's flexion/abduction/IR x 3 min each  Standing rows BTB 3x15  Standing ER/IR step-outs BTB 3x15  Standing shoulder extension BTB 3x15  Serratus walk-outs YTB 2x20  Sidelying ER 1# 2x15  Body blade 90 degrees flexion/abduction x 30 seconds each  Frontal/lateral raise 3# 2x15    Standing banded shoulder flexion 2# 2x15 - NP  Serratus punches with wand 3# 2x30 - NP  Ball bounces red ball 2x60 seconds - NP  Supine shoulder flexion 3#  2x30 - NP  Standing IR stretch with towel 10x5 second - NP  Banded serratus wall slides with YTB 2x15 - NP  Supine external rotation with wand 10x5 second hold - NP  Wall walks flexion/abduction 10 second x 10 - NP  Standing extension with IR with wand 2x10 - NP    Kaleb received the following manual therapy techniques: Joint mobilizations, prolonged stretching were applied to the: Right shoulder for 15 minutes, including:  Posterior glides, distractions  Shoulder prolonged stretching/PROM flexion, abduction, IR/ER    Kaleb received cold pack for 10 minutes to right shoulder.      Home Exercises Provided and Patient Education Provided     Education provided:   - continue with HEP, ice as needed    Written Home Exercises Provided: Patient instructed to cont prior HEP.  Exercises were reviewed and Kaleb was able to demonstrate them prior to the end of the session.  Kaleb demonstrated good  understanding of the education provided.     See EMR under Media for exercises provided prior visit.    Assessment     Patient demonstrated improved score on FOTO and A/PROM compared to initially evaluation. He has follow-up with MD Barone.    aKleb is progressing well towards his goals.   Pt prognosis is Good.     Pt will continue to benefit from skilled outpatient physical therapy to address the deficits listed in the problem list box on initial evaluation, provide pt/family education and to maximize pt's level of independence in the home and community environment.     Pt's spiritual, cultural and educational needs considered and pt agreeable to plan of care and goals.    Anticipated barriers to physical therapy: None    Goals: Short Term GOALS: 3 weeks  1. Patient demonstrates independence with HEP. (ONGOING)  2. Patient demonstrates increased right shoulder ROM to 150 degrees flexion, 100 degrees abduction, ER 40 degrees, and IR 50 degrees to improve tolerance to functional activities with no more than 2/10 pain.  (ONGOING)  3. Patient demonstrates improved overall function per FOTO Shoulder Survey to 20% Limitation or less. (ONGOING)     Long Term GOALS: 6 weeks  1. Patient demonstrates increased right shoulder ROM to 180 degrees flexion/abduction, ER 80 degrees, and IR 70 degrees to improve tolerance to functional activities pain free. (ONGOING)  2. Patient demonstrates increased strength BLE's to 5/5 or greater to improve tolerance to functional activities pain free. (ONGOING)  3. Patient demonstrates improved overall function per FOTO Shoulder Survey to 10% Limitation or less. (ONGOING)    Plan     Continue with POC, progress as tolerated    Ada Eddy, PT, DPT

## 2018-11-16 ENCOUNTER — CLINICAL SUPPORT (OUTPATIENT)
Dept: REHABILITATION | Facility: HOSPITAL | Age: 53
End: 2018-11-16
Payer: COMMERCIAL

## 2018-11-16 DIAGNOSIS — M25.511 ACUTE PAIN OF RIGHT SHOULDER: ICD-10-CM

## 2018-11-16 PROCEDURE — 97140 MANUAL THERAPY 1/> REGIONS: CPT

## 2018-11-16 PROCEDURE — 97110 THERAPEUTIC EXERCISES: CPT

## 2018-11-16 NOTE — PROGRESS NOTES
Physical Therapy Daily Treatment Note     Name: Kaleb Rendon Jr.  Clinic Number: [id number]    Therapy Diagnosis:   Encounter Diagnosis   Name Primary?    Acute pain of right shoulder      Physician: Morelia Truong DO    Visit Date: 11/16/2018    Physician Orders: PT Eval and Treat Right Shoulder  Note: Right shoulder ROM and RTC strengthening for R shoulder RTC tendinosis and bursitis. Early signs of adhesive capsulitis on exam.  Medical Diagnosis from Referral: M25.511 (ICD-10-CM) - Right shoulder pain, unspecified chronicity   Evaluation Date: 10/12/2018  Authorization Period Expiration: 10/9/19  Plan of Care Expiration: 11/23/18  Visit # / Visits authorized: 11/20    Time In: 1:00pm  Time Out: 2:15pm  Total Billable Time: 55 minutes    Precautions: Standard    Subjective     Pt reports: no pain today or following last session. He reports combing hair and tucking in his shirt with more ease.  He was compliant with home exercise program.  Response to previous treatment: No complaints  Functional change: N/A    Pain: 0/10  Location: right shoulder      Objective     Kaleb received heat pack for 10 minutes to right shoulder prior to session.    Kaleb received therapeutic exercises to develop strength, endurance, ROM and posture for 40 minutes including:  UBE 3' forward/3' backward level 3  Pulley's flexion/IR x 3 min each  Standing rows BTB 3x15  Standing ER/IR step-outs BTB 3x15  Standing shoulder extension BTB 3x15 - NP  Serratus walk-outs, walk-up/downs YTB x20  Bicep curls 6# 2x15    Sidelying ER 1# 2x15 - NP  Body blade 90 degrees flexion/abduction x 30 seconds each - NP  Frontal/lateral raise 3# 2x15 - NP  Standing banded shoulder flexion 2# 2x15 - NP  Serratus punches with wand 3# 2x30 - NP  Ball bounces red ball 2x60 seconds - NP  Supine shoulder flexion 3# 2x30 - NP  Standing IR stretch with towel 10x5 second - NP  Banded serratus wall slides with YTB 2x15 - NP  Supine external rotation with wand  10x5 second hold - NP  Wall walks flexion/abduction 10 second x 10 - NP  Standing extension with IR with wand 2x10 - NP    Kaleb received the following manual therapy techniques: Joint mobilizations, prolonged stretching were applied to the: Right shoulder for 10 minutes, including:  STM with Starkey to bicep tendon, middle delt area  Posterior glides, distractions  Shoulder prolonged stretching/PROM flexion, abduction, IR/ER    Kaleb received cold pack for 10 minutes to right shoulder.      Home Exercises Provided and Patient Education Provided     Education provided:   - continue with HEP, ice as needed    Written Home Exercises Provided: Patient instructed to cont prior HEP.  Exercises were reviewed and Kaleb was able to demonstrate them prior to the end of the session.  Kaleb demonstrated good  understanding of the education provided.     See EMR under Media for exercises provided prior visit.    Assessment     Patient had difficulty with 90-90 ER/IR exercises and horizontal abduction, terminated due to pain and poor form. Patient demonstrated improved ROM with other exercises.    Kaleb is progressing well towards his goals.   Pt prognosis is Good.     Pt will continue to benefit from skilled outpatient physical therapy to address the deficits listed in the problem list box on initial evaluation, provide pt/family education and to maximize pt's level of independence in the home and community environment.     Pt's spiritual, cultural and educational needs considered and pt agreeable to plan of care and goals.    Anticipated barriers to physical therapy: None    Goals: Short Term GOALS: 3 weeks  1. Patient demonstrates independence with HEP. (ONGOING)  2. Patient demonstrates increased right shoulder ROM to 150 degrees flexion, 100 degrees abduction, ER 40 degrees, and IR 50 degrees to improve tolerance to functional activities with no more than 2/10 pain. (ONGOING)  3. Patient demonstrates improved overall  function per FOTO Shoulder Survey to 20% Limitation or less. (ONGOING)     Long Term GOALS: 6 weeks  1. Patient demonstrates increased right shoulder ROM to 180 degrees flexion/abduction, ER 80 degrees, and IR 70 degrees to improve tolerance to functional activities pain free. (ONGOING)  2. Patient demonstrates increased strength BLE's to 5/5 or greater to improve tolerance to functional activities pain free. (ONGOING)  3. Patient demonstrates improved overall function per FOTO Shoulder Survey to 10% Limitation or less. (ONGOING)    Plan     Continue with POC, progress as tolerated    Ada Eddy, PT, DPT

## 2018-11-20 ENCOUNTER — OFFICE VISIT (OUTPATIENT)
Dept: SPORTS MEDICINE | Facility: CLINIC | Age: 53
End: 2018-11-20
Payer: COMMERCIAL

## 2018-11-20 VITALS
HEIGHT: 73 IN | DIASTOLIC BLOOD PRESSURE: 87 MMHG | HEART RATE: 83 BPM | WEIGHT: 207 LBS | BODY MASS INDEX: 27.43 KG/M2 | SYSTOLIC BLOOD PRESSURE: 134 MMHG

## 2018-11-20 DIAGNOSIS — M99.07 UPPER EXTREMITY SOMATIC DYSFUNCTION: ICD-10-CM

## 2018-11-20 DIAGNOSIS — M25.511 RIGHT SHOULDER PAIN, UNSPECIFIED CHRONICITY: ICD-10-CM

## 2018-11-20 DIAGNOSIS — M75.51 SUBACROMIAL BURSITIS OF RIGHT SHOULDER JOINT: ICD-10-CM

## 2018-11-20 DIAGNOSIS — M67.813 TENDINOSIS OF RIGHT SHOULDER: Primary | ICD-10-CM

## 2018-11-20 DIAGNOSIS — M99.08 SOMATIC DYSFUNCTION OF RIB CAGE REGION: ICD-10-CM

## 2018-11-20 DIAGNOSIS — M75.01 ADHESIVE CAPSULITIS OF RIGHT SHOULDER: ICD-10-CM

## 2018-11-20 DIAGNOSIS — M99.02 SOMATIC DYSFUNCTION OF THORACIC REGION: ICD-10-CM

## 2018-11-20 PROCEDURE — 99213 OFFICE O/P EST LOW 20 MIN: CPT | Mod: 25,S$GLB,, | Performed by: NEUROMUSCULOSKELETAL MEDICINE & OMM

## 2018-11-20 PROCEDURE — 3075F SYST BP GE 130 - 139MM HG: CPT | Mod: CPTII,S$GLB,, | Performed by: NEUROMUSCULOSKELETAL MEDICINE & OMM

## 2018-11-20 PROCEDURE — 3079F DIAST BP 80-89 MM HG: CPT | Mod: CPTII,S$GLB,, | Performed by: NEUROMUSCULOSKELETAL MEDICINE & OMM

## 2018-11-20 PROCEDURE — 98926 OSTEOPATH MANJ 3-4 REGIONS: CPT | Mod: S$GLB,,, | Performed by: NEUROMUSCULOSKELETAL MEDICINE & OMM

## 2018-11-20 PROCEDURE — 3008F BODY MASS INDEX DOCD: CPT | Mod: CPTII,S$GLB,, | Performed by: NEUROMUSCULOSKELETAL MEDICINE & OMM

## 2018-11-20 PROCEDURE — 99999 PR PBB SHADOW E&M-EST. PATIENT-LVL III: CPT | Mod: PBBFAC,,, | Performed by: NEUROMUSCULOSKELETAL MEDICINE & OMM

## 2018-11-20 NOTE — PROGRESS NOTES
"Subjective:     Kaleb Rendon Jr.    Chief Complaint   Patient presents with    Follow-up     Right Shoulder       HPI    Kaleb is a 53 y.o. male coming in today for right shoulder pain.Since last visit the pain has Improved. Pt. describes the pain as a 0/10. He only has pain if he forces the shoulder at end range. He notes his ROM is improving but still restricted with extension/IR. PT is going well. There has not been any new a fall/injury/ or traumas since last visit.  HePt. denies any new musculoskeletal complaints at this time.     Joint instability? no  Mechanical locking/clicking? no  Affecting ADL's? Yes- mild difficulty with reaching behind back, i.e. tucking in shirt  Affecting sleep? no    Review of Systems   Constitutional: Negative for chills and fever.   Musculoskeletal: Negative for back pain, falls, joint pain, myalgias and neck pain.   Neurological: Negative for dizziness, tingling, focal weakness, weakness and headaches.       PAST MEDICAL HISTORY:   Past Medical History:   Diagnosis Date    Colon polyp     Hypertension      PAST SURGICAL HISTORY:   Past Surgical History:   Procedure Laterality Date    COLONOSCOPY      COLONOSCOPY N/A 1/24/2017    Procedure: COLONOSCOPY;  Surgeon: DIETER Goldsmith MD;  Location: [facility] ([address] [place] [address]);  Service: Endoscopy;  Laterality: N/A;    COLONOSCOPY N/A 1/24/2017    Performed by DIETER Goldsmith MD at [facility] ([address] [place] [address])    COLONOSCOPY W/ POLYPECTOMY      HERNIA REPAIR         MEDICATIONS:   Current Outpatient Medications:     amlodipine-benazepril 5-10 mg (LOTREL) 5-10 mg per capsule, Take 1 capsule by mouth once daily., Disp: , Rfl: 11  ALLERGIES: Review of patient's allergies indicates:  No Known Allergies      Objective:     VITAL SIGNS: /87   Pulse 83   Ht 6' 1" (1.854 m)   Wt 93.9 kg (207 lb)   BMI 27.31 kg/m²    General    Vitals reviewed.  Constitutional: He is oriented to person, place, and time. He appears well-developed and " well-nourished.   Neurological: He is alert and oriented to person, place, and time.   Psychiatric: He has a normal mood and affect. His behavior is normal.               MUSCULOSKELETAL EXAM  Shoulder: right SHOULDER  The affected shoulder is compared to the contralateral shoulder.    Observation:    CERVICAL SPINE  Anterior head carriage. Increased thoracic kyphosis.    SHOULDER  No ecchymosis, edema, or erythema throughout the shoulder girdle.  No sternal, clavicular, or acromial deformities bilaterally.  No atrophy of the pectorals, deltoids, supraspinatus, infraspinatus, or biceps bilaterally.  Shoulder heights symmetric  Posture:  Increased thoracic kyphosis and Anterior head carriage  Gait: Non-antalgic       ROM:  CERVICAL SPINE  Full AROM in flexion, extension, sidebending, and rotation without tenderness.     SHOULDER(* = with pain)  Active flexion to 180° on left and 180° on right  Active abduction to 180° on left and 180° on right   Active internal rotation to T7 on left and T12 on right *   Active external rotation to T4 on left and T4 on right    No scapular dyskinesia or winging.     Tenderness:  No tenderness at the AC joint  No tenderness over the clavicle   No tenderness over biceps tendon or bicipital groove  No tenderness over subacromial space  No tenderness over RTC footprint at greater tubercle of humerus     Strength Testing (* = with pain):  Deltoid - 5/5 on left and 5/5 on right  Biceps - 5/5 on left and 5/5 on right  Triceps - 5/5 on left and 5/5 on right  Wrist extension - 5/5 on left and 5/5 on right  Wrist flexion - 5/5 on left and 5/5 on right   - 5/5 on left and 5/5 on right  Finger extension - 5/5 on left and 5/5 on right  Finger abduction - 5/5 on left and 5/5 on right  Scaption- 5-/5 on right, 5/5 on left  IR- 5/5 on right, 5/5 on left  External rotation- 5-/5 on right, 5/5 on left     Special Tests:  Empty can test - negative  Full can test - negative  Bear hug test -  negative  Resisted internal rotation - negative  Resisted external rotation - negative     Neer's test - negative  Hawkin's-Cecilio test - positive  Cross-arm test- negative     OKeiths test - negative      Sulcus sign - none  AP load and shift laxity - none     Speed's test - negative  Yergason's test - negative     Neurovascular Exam:  2+ radial pulses bilaterally  Sensation intact to light touch in the distal median, radial, and ulnar nerve distributions bilaterally.  Capillary refill intact <2 seconds in all digits bilaterally      TART (Tissue texture abnormality, Asymmetry,  Restriction of motion and/or Tenderness) changes:       Thoracic Spine   T1 Neutral   T2 Neutral   T3 Neutral   T4 Neutral   T5 Neutral   T6 Neutral   T7 FRS RIGHT   T8 FRS RIGHT   T9 ERS RIGHT   T10 Neutral   T11 Neutral   T12 Neutral     Rib cage: Rib 7 external torsion on right    Upper extremity:    Region Finding/resrtiction   SC joint Neutral   AC joint Neutral   GH joint Right Trigger band (TB) fascial distortion      Right biceps Herniated trigger point (HTP) fascial distortion            Key   F= Flexed   E = Extended   R = Rotated   S = Sidebent   TTA = tissue texture abnormality           Assessment:      Encounter Diagnoses   Name Primary?    Tendinosis of right shoulder Yes    Right shoulder pain, unspecified chronicity     Adhesive capsulitis of right shoulder     Subacromial bursitis of right shoulder joint     Somatic dysfunction of thoracic region     Somatic dysfunction of rib cage region     Upper extremity somatic dysfunction           Plan:     1. Right shoulder pain significantly improved with PT and subacromial corticosteroid injection. Adhesive capsulitis resolved. Still lacking internal rotation ROM and slight supraspinatus and external rotation strength on physical exam today.   - Supraspinatus tendinosis with subacromial bursitis shown on MRI from 10/8/18   - Continue PT and HEP- stressed the importance  of continuing HEP following completion of PT  - OMT performed today to address contributing biomechanical restrictions    2. OMT 3-4 regions. Oral consent obtained.  Reviewed benefits and potential side effects, including bruising at site of treatment and increased soreness for the next 24-48 hours. Pt. Instructed to increased water intake by 1 L today and tomorrow to help with any residual soreness.   - OMT indicated today due to signs and symptoms as well as local and remote somatic dysfunction findings and their related neurokinetic, lymphatic, fascial and/or arteriovenous body connections.   - OMT techniques used: Muscle Energy, Still's Technique and Fascial Distortion Model   - Treatment was tolerated well. Improvement noted in segmental mobility post-treatment in dysfunctional regions. There were no adverse events and no complications immediately following treatment.     3. Follow-up in 4 weeks for reevaluation    4. Patient agreeable to today's plan and all questions were answered

## 2018-11-26 ENCOUNTER — CLINICAL SUPPORT (OUTPATIENT)
Dept: REHABILITATION | Facility: HOSPITAL | Age: 53
End: 2018-11-26
Payer: COMMERCIAL

## 2018-11-26 DIAGNOSIS — M25.511 ACUTE PAIN OF RIGHT SHOULDER: ICD-10-CM

## 2018-11-26 PROCEDURE — 97110 THERAPEUTIC EXERCISES: CPT

## 2018-11-26 PROCEDURE — 97140 MANUAL THERAPY 1/> REGIONS: CPT

## 2018-11-26 NOTE — PROGRESS NOTES
Dry needling performed by CHRISTEL Castillo, PT, DPT to (ZITA) rolf to address restriction in deltoid and infraspinatus limiting functional movement. Patient noted deep ache in all treatment areas. Strong twitch response elicited in anterior deltoid, mild twitch in middle deltoid. Patient was able to complete all exercises without symptom aggravation; he felt improved quality of movement post-intervention.

## 2018-11-26 NOTE — PROGRESS NOTES
Physical Therapy Daily Treatment Note     Name: Kaleb Rendon Jr.  Clinic Number: [id number]    Therapy Diagnosis:   Encounter Diagnosis   Name Primary?    Acute pain of right shoulder      Physician: Morelia Truong DO    Visit Date: 11/26/2018    Physician Orders: PT Eval and Treat Right Shoulder  Note: Right shoulder ROM and RTC strengthening for R shoulder RTC tendinosis and bursitis. Early signs of adhesive capsulitis on exam.  Medical Diagnosis from Referral: M25.511 (ICD-10-CM) - Right shoulder pain, unspecified chronicity   Evaluation Date: 10/12/2018  Authorization Period Expiration: 10/9/19  Updated Plan of Care Expiration: 01/04/2019  Visit # / Visits authorized: 12/20    Time In: 11:00am  Time Out: 12:10pm  Total Billable Time: 50 minutes    Precautions: Standard    Subjective     Pt reports: no pain today or following last session. He saw MD last week.  He was compliant with home exercise program.  Response to previous treatment: No complaints  Functional change: N/A    Pain: 0/10  Location: right shoulder      Objective     Kaleb received heat pack for 10 minutes to right shoulder prior to session.    Kaleb received therapeutic exercises to develop strength, endurance, ROM and posture for 25 minutes including:  Standing rows BTB 3x15  Standing ER/IR step-outs BTB 3x15  Standing shoulder extension BTB 3x15  Standing IR stretch with towel 10x10 second    UBE 3' forward/3' backward level 3 - NP  Mal's flexion/IR x 3 min each - NP  Serratus walk-outs, walk-up/downs YTB x20 - NP  Bicep curls 6# 2x15 - NP  Sidelying ER 1# 2x15 - NP  Body blade 90 degrees flexion/abduction x 30 seconds each - NP  Frontal/lateral raise 3# 2x15 - NP  Standing banded shoulder flexion 2# 2x15 - NP  Serratus punches with wand 3# 2x30 - NP  Ball bounces red ball 2x60 seconds - NP  Supine shoulder flexion 3# 2x30 - NP  Banded serratus wall slides with YTB 2x15 - NP  Supine external rotation with wand 10x5 second hold -  NP  Wall walks flexion/abduction 10 second x 10 - NP  Standing extension with IR with wand 2x10 - NP    Kaleb received the following manual therapy techniques: Joint mobilizations, prolonged stretching were applied to the: Right shoulder for 20 minutes, including:  Posterior glides, distractions  Shoulder prolonged stretching/PROM flexion, abduction, IR/ER    Kaleb received cold pack for 10 minutes to right shoulder.      Home Exercises Provided and Patient Education Provided     Education provided:   - continue with HEP, ice as needed    Written Home Exercises Provided: Patient instructed to cont prior HEP.  Exercises were reviewed and Kaleb was able to demonstrate them prior to the end of the session.  Kaleb demonstrated good  understanding of the education provided.     See EMR under Media for exercises provided prior visit.    Assessment     Patient felt relief following dry needling and demonstrated improved PROM.    Kaleb is progressing well towards his goals.   Pt prognosis is Good.     Pt will continue to benefit from skilled outpatient physical therapy to address the deficits listed in the problem list box on initial evaluation, provide pt/family education and to maximize pt's level of independence in the home and community environment.     Pt's spiritual, cultural and educational needs considered and pt agreeable to plan of care and goals.    Anticipated barriers to physical therapy: None    Goals: Short Term GOALS: 3 weeks  1. Patient demonstrates independence with HEP. (ONGOING)  2. Patient demonstrates increased right shoulder ROM to 150 degrees flexion, 100 degrees abduction, ER 40 degrees, and IR 50 degrees to improve tolerance to functional activities with no more than 2/10 pain. (ONGOING)  3. Patient demonstrates improved overall function per FOTO Shoulder Survey to 20% Limitation or less. (ONGOING)     Long Term GOALS: 6 weeks  1. Patient demonstrates increased right shoulder ROM to 180  degrees flexion/abduction, ER 80 degrees, and IR 70 degrees to improve tolerance to functional activities pain free. (ONGOING)  2. Patient demonstrates increased strength BLE's to 5/5 or greater to improve tolerance to functional activities pain free. (ONGOING)  3. Patient demonstrates improved overall function per FOTO Shoulder Survey to 10% Limitation or less. (ONGOING)    Plan     Certification Period: 11/23/2018 to 01/04/2019  Recommended Treatment Plan: 2 times per week for 6 weeks: Manual Therapy, Moist Heat/ Ice, Neuromuscular Re-ed, Patient Education, Self Care, Therapeutic Activites and Therapeutic Exercise  Other Recommendations: modalities prn, ASTYM prn, kinesiotape prn, Functional Dry Needling prn       Therapist: Ada Eddy PT    I CERTIFY THE NEED FOR THESE SERVICES FURNISHED UNDER THIS PLAN OF TREATMENT AND WHILE UNDER MY CARE    Physician's comments: ________________________________________________________________________________________________________________________________________________      Physician's Name: ___________________________________    Ada Eddy PT, DPT

## 2018-11-29 ENCOUNTER — CLINICAL SUPPORT (OUTPATIENT)
Dept: REHABILITATION | Facility: HOSPITAL | Age: 53
End: 2018-11-29
Payer: COMMERCIAL

## 2018-11-29 DIAGNOSIS — M25.511 ACUTE PAIN OF RIGHT SHOULDER: ICD-10-CM

## 2018-11-29 PROCEDURE — 97110 THERAPEUTIC EXERCISES: CPT

## 2018-11-29 PROCEDURE — 97140 MANUAL THERAPY 1/> REGIONS: CPT

## 2018-11-29 NOTE — PROGRESS NOTES
Physical Therapy Daily Treatment Note     Name: Kaleb Rendon Jr.  Clinic Number: [id number]    Therapy Diagnosis:   Encounter Diagnosis   Name Primary?    Acute pain of right shoulder      Physician: Morelia Truong DO    Visit Date: 11/29/2018    Physician Orders: PT Eval and Treat Right Shoulder  Note: Right shoulder ROM and RTC strengthening for R shoulder RTC tendinosis and bursitis. Early signs of adhesive capsulitis on exam.  Medical Diagnosis from Referral: M25.511 (ICD-10-CM) - Right shoulder pain, unspecified chronicity   Evaluation Date: 10/12/2018  Authorization Period Expiration: 10/9/19  Updated Plan of Care Expiration: 01/04/2019  Visit # / Visits authorized: 13/20    Time In: 11:03am  Time Out: 12:10pm  Total Billable Time: 60 minutes    Precautions: Standard    Subjective     Pt reports: no pain today or following last session. He reported some soreness following last session.  He was compliant with home exercise program.  Response to previous treatment: No complaints  Functional change: N/A    Pain: 0/10  Location: right shoulder      Objective     Kaleb received heat pack for 10 minutes to right shoulder prior to session.    Kaleb received therapeutic exercises to develop strength, endurance, ROM and posture for 40 minutes including:  UBE 3' forward/3' backward level 3.5  Standing rows BTB 3x15  Standing ER/IR step-outs BTB 3x15  Standing shoulder extension BTB 3x15  Standing IR stretch with towel 10x10 second  Serratus wall slides YTB 2x20  Ball circles on wall clockwise/counterclockwise 2x30  Standing upright row with wand 3# 3x15  Standing extension with IR with wand 3# 3x15  Prone rows 3# 3x15  Prone shoulder extension 3x15  Prone shoulder abduction 3x15      Pulley's flexion/IR x 3 min each - NP  Serratus walk-outs, walk-up/downs YTB x20 - NP  Bicep curls 6# 2x15 - NP  Sidelying ER 1# 2x15 - NP  Body blade 90 degrees flexion/abduction x 30 seconds each - NP  Frontal/lateral raise 3#  2x15 - NP  Standing banded shoulder flexion 2# 2x15 - NP  Serratus punches with wand 3# 2x30 - NP  Ball bounces red ball 2x60 seconds - NP  Supine shoulder flexion 3# 2x30 - NP  Banded serratus wall slides with YTB 2x15 - NP  Supine external rotation with wand 10x5 second hold - NP  Wall walks flexion/abduction 10 second x 10 - NP  Standing extension with IR with wand 2x10 - NP    Kaleb received the following manual therapy techniques: Joint mobilizations, prolonged stretching were applied to the: Right shoulder for 20 minutes, including:  Posterior glides, distractions  Shoulder prolonged stretching/PROM flexion, abduction, IR/ER    Kaleb received cold pack for 10 minutes to right shoulder.      Home Exercises Provided and Patient Education Provided     Education provided:   - continue with HEP, ice as needed    Written Home Exercises Provided: Patient instructed to cont prior HEP.  Exercises were reviewed and Kaleb was able to demonstrate them prior to the end of the session.  Kaleb demonstrated good  understanding of the education provided.     See EMR under Media for exercises provided prior visit.    Assessment     Patient demonstrated improved PROM with stretching today.    Kaleb is progressing well towards his goals.   Pt prognosis is Good.     Pt will continue to benefit from skilled outpatient physical therapy to address the deficits listed in the problem list box on initial evaluation, provide pt/family education and to maximize pt's level of independence in the home and community environment.     Pt's spiritual, cultural and educational needs considered and pt agreeable to plan of care and goals.    Anticipated barriers to physical therapy: None    Goals: Short Term GOALS: 3 weeks  1. Patient demonstrates independence with HEP. (ONGOING)  2. Patient demonstrates increased right shoulder ROM to 150 degrees flexion, 100 degrees abduction, ER 40 degrees, and IR 50 degrees to improve tolerance to  functional activities with no more than 2/10 pain. (ONGOING)  3. Patient demonstrates improved overall function per FOTO Shoulder Survey to 20% Limitation or less. (ONGOING)     Long Term GOALS: 6 weeks  1. Patient demonstrates increased right shoulder ROM to 180 degrees flexion/abduction, ER 80 degrees, and IR 70 degrees to improve tolerance to functional activities pain free. (ONGOING)  2. Patient demonstrates increased strength BLE's to 5/5 or greater to improve tolerance to functional activities pain free. (ONGOING)  3. Patient demonstrates improved overall function per FOTO Shoulder Survey to 10% Limitation or less. (ONGOING)    Plan     Continue with POC, progress as tolerated    Ada Eddy, PT, DPT

## 2018-12-03 ENCOUNTER — CLINICAL SUPPORT (OUTPATIENT)
Dept: REHABILITATION | Facility: HOSPITAL | Age: 53
End: 2018-12-03
Payer: COMMERCIAL

## 2018-12-03 DIAGNOSIS — M25.511 ACUTE PAIN OF RIGHT SHOULDER: ICD-10-CM

## 2018-12-03 PROCEDURE — 97140 MANUAL THERAPY 1/> REGIONS: CPT

## 2018-12-03 PROCEDURE — 97110 THERAPEUTIC EXERCISES: CPT

## 2018-12-03 NOTE — PROGRESS NOTES
Physical Therapy Daily Treatment Note     Name: Kaleb Rendon Jr.  Clinic Number: [id number]    Therapy Diagnosis:   Encounter Diagnosis   Name Primary?    Acute pain of right shoulder      Physician: Morelia Truong DO    Visit Date: 12/3/2018    Physician Orders: PT Eval and Treat Right Shoulder  Note: Right shoulder ROM and RTC strengthening for R shoulder RTC tendinosis and bursitis. Early signs of adhesive capsulitis on exam.  Medical Diagnosis from Referral: M25.511 (ICD-10-CM) - Right shoulder pain, unspecified chronicity   Evaluation Date: 10/12/2018  Authorization Period Expiration: 10/9/19  Updated Plan of Care Expiration: 01/04/2019  Visit # / Visits authorized: 14/20    Time In: 11:05am  Time Out: 12:10pm  Total Billable Time: 45 minutes    Precautions: Standard    Subjective     Pt reports: no pain today or following last session.  He was compliant with home exercise program.  Response to previous treatment: No complaints  Functional change: N/A    Pain: 0/10  Location: right shoulder      Objective     Kaleb received heat pack for 10 minutes to right shoulder prior to session.    Kaleb received therapeutic exercises to develop strength, endurance, ROM and posture for 35 minutes including:  UBE 3' forward/3' backward level 3.5  Standing rows BTB 3x20  Standing ER/IR step-outs BTB 3x20  Standing shoulder extension BTB 3x20  Prone rows 4# 3x20  Prone shoulder extension 1# 3x20  Prone shoulder abduction 3x15    Not performed:  Standing IR stretch with towel 10x10 second  Serratus wall slides YTB 2x20  Ball circles on wall clockwise/counterclockwise 2x30  Standing upright row with wand 3# 3x15  Standing extension with IR with wand 3# 3x15  Pulley's flexion/IR x 3 min each - NP  Serratus walk-outs, walk-up/downs YTB x20 - NP  Bicep curls 6# 2x15 - NP  Sidelying ER 1# 2x15 - NP  Body blade 90 degrees flexion/abduction x 30 seconds each - NP  Frontal/lateral raise 3# 2x15 - NP  Standing banded shoulder  flexion 2# 2x15 - NP  Serratus punches with wand 3# 2x30 - NP  Ball bounces red ball 2x60 seconds - NP  Supine shoulder flexion 3# 2x30 - NP  Banded serratus wall slides with YTB 2x15 - NP  Supine external rotation with wand 10x5 second hold - NP  Wall walks flexion/abduction 10 second x 10 - NP  Standing extension with IR with wand 2x10 - NP    Kaleb received the following manual therapy techniques: Joint mobilizations, prolonged stretching were applied to the: Right shoulder for 10 minutes, including:  Posterior glides, distractions  Shoulder prolonged stretching/PROM flexion, abduction, IR/ER    Kaleb received cold pack for 10 minutes to right shoulder.      Home Exercises Provided and Patient Education Provided     Education provided:   - continue with HEP, ice as needed    Written Home Exercises Provided: Patient instructed to cont prior HEP.  Exercises were reviewed and Kaleb was able to demonstrate them prior to the end of the session.  Kaleb demonstrated good  understanding of the education provided.     See EMR under Media for exercises provided prior visit.    Assessment     Patient tolerated increased increase in repetitions today with no reports of pain just muscle fatigue.    Kaleb is progressing well towards his goals.   Pt prognosis is Good.     Pt will continue to benefit from skilled outpatient physical therapy to address the deficits listed in the problem list box on initial evaluation, provide pt/family education and to maximize pt's level of independence in the home and community environment.     Pt's spiritual, cultural and educational needs considered and pt agreeable to plan of care and goals.    Anticipated barriers to physical therapy: None    Goals: Short Term GOALS: 3 weeks  1. Patient demonstrates independence with HEP. (ONGOING)  2. Patient demonstrates increased right shoulder ROM to 150 degrees flexion, 100 degrees abduction, ER 40 degrees, and IR 50 degrees to improve tolerance  to functional activities with no more than 2/10 pain. (ONGOING)  3. Patient demonstrates improved overall function per FOTO Shoulder Survey to 20% Limitation or less. (ONGOING)     Long Term GOALS: 6 weeks  1. Patient demonstrates increased right shoulder ROM to 180 degrees flexion/abduction, ER 80 degrees, and IR 70 degrees to improve tolerance to functional activities pain free. (ONGOING)  2. Patient demonstrates increased strength BLE's to 5/5 or greater to improve tolerance to functional activities pain free. (ONGOING)  3. Patient demonstrates improved overall function per FOTO Shoulder Survey to 10% Limitation or less. (ONGOING)    Plan     Continue with POC, progress as tolerated    Ada Eddy, PT, DPT

## 2018-12-07 ENCOUNTER — CLINICAL SUPPORT (OUTPATIENT)
Dept: REHABILITATION | Facility: HOSPITAL | Age: 53
End: 2018-12-07
Payer: COMMERCIAL

## 2018-12-07 DIAGNOSIS — M25.511 ACUTE PAIN OF RIGHT SHOULDER: ICD-10-CM

## 2018-12-07 PROCEDURE — 97140 MANUAL THERAPY 1/> REGIONS: CPT

## 2018-12-07 PROCEDURE — 97110 THERAPEUTIC EXERCISES: CPT

## 2018-12-07 NOTE — PROGRESS NOTES
Physical Therapy Daily Treatment Note     Name: Kaleb Rendon Jr.  Clinic Number: [id number]    Therapy Diagnosis:   Encounter Diagnosis   Name Primary?    Acute pain of right shoulder      Physician: Morelia Truong DO    Visit Date: 12/7/2018    Physician Orders: PT Eval and Treat Right Shoulder  Note: Right shoulder ROM and RTC strengthening for R shoulder RTC tendinosis and bursitis. Early signs of adhesive capsulitis on exam.  Medical Diagnosis from Referral: M25.511 (ICD-10-CM) - Right shoulder pain, unspecified chronicity   Evaluation Date: 10/12/2018  Authorization Period Expiration: 10/9/19  Updated Plan of Care Expiration: 01/04/2019  Visit # / Visits authorized: 15/20    Time In: 2:00pm  Time Out: 3:05pm  Total Billable Time: 55 minutes    Precautions: Standard    Subjective     Pt reports: no pain today or following last session.  He was compliant with home exercise program.  Response to previous treatment: No complaints  Functional change: N/A    Pain: 0/10  Location: right shoulder      Objective     Kaleb received heat pack for 10 minutes to right shoulder prior to session.    Kaleb received therapeutic exercises to develop strength, endurance, ROM and posture for 40 minutes including:  UBE 3' forward/3' backward level 3.5  Wall slides shoulder flexion, abduction stretch with towel 10 second x 10  Standing IR stretch with towel 10x10 second  Standing rows BTB 3x20  Standing ER/IR step-outs BTB 3x20  Standing shoulder extension BTB 3x20  Supine shoulder flexion 5.5# 10 second hold x 15  Sidelying ER 2# 3x15    Not performed:  Prone rows 4# 3x20  Prone shoulder extension 1# 3x20  Prone shoulder abduction 3x15  Serratus wall slides YTB 2x20  Ball circles on wall clockwise/counterclockwise 2x30  Standing upright row with wand 3# 3x15  Standing extension with IR with wand 3# 3x15  Pulley's flexion/IR x 3 min each - NP  Serratus walk-outs, walk-up/downs YTB x20 - NP  Bicep curls 6# 2x15 - NP  Body  blade 90 degrees flexion/abduction x 30 seconds each - NP  Frontal/lateral raise 3# 2x15 - NP  Standing banded shoulder flexion 2# 2x15 - NP  Serratus punches with wand 3# 2x30 - NP  Ball bounces red ball 2x60 seconds - NP  Banded serratus wall slides with YTB 2x15 - NP  Supine external rotation with wand 10x5 second hold - NP  Wall walks flexion/abduction 10 second x 10 - NP  Standing extension with IR with wand 2x10 - NP    Kaleb received the following manual therapy techniques: Joint mobilizations, prolonged stretching were applied to the: Right shoulder for 15 minutes, including:  Posterior glides, distractions  Shoulder prolonged stretching/PROM flexion, abduction, IR/ER    Kaleb received cold pack for 10 minutes to right shoulder.      Home Exercises Provided and Patient Education Provided     Education provided:   - continue with HEP, ice as needed    Written Home Exercises Provided: Patient instructed to cont prior HEP.  Exercises were reviewed and Kaleb was able to demonstrate them prior to the end of the session.  Kaleb demonstrated good  understanding of the education provided.     See EMR under Media for exercises provided prior visit.    Assessment     Patient demonstrated improved ROM with exercises today. Reported pain with end range stretching today.    Kaleb is progressing well towards his goals.   Pt prognosis is Good.     Pt will continue to benefit from skilled outpatient physical therapy to address the deficits listed in the problem list box on initial evaluation, provide pt/family education and to maximize pt's level of independence in the home and community environment.     Pt's spiritual, cultural and educational needs considered and pt agreeable to plan of care and goals.    Anticipated barriers to physical therapy: None    Goals: Short Term GOALS: 3 weeks  1. Patient demonstrates independence with HEP. (ONGOING)  2. Patient demonstrates increased right shoulder ROM to 150 degrees  flexion, 100 degrees abduction, ER 40 degrees, and IR 50 degrees to improve tolerance to functional activities with no more than 2/10 pain. (ONGOING)  3. Patient demonstrates improved overall function per FOTO Shoulder Survey to 20% Limitation or less. (ONGOING)     Long Term GOALS: 6 weeks  1. Patient demonstrates increased right shoulder ROM to 180 degrees flexion/abduction, ER 80 degrees, and IR 70 degrees to improve tolerance to functional activities pain free. (ONGOING)  2. Patient demonstrates increased strength BLE's to 5/5 or greater to improve tolerance to functional activities pain free. (ONGOING)  3. Patient demonstrates improved overall function per FOTO Shoulder Survey to 10% Limitation or less. (ONGOING)    Plan     Continue with POC, progress as tolerated    Ada Eddy, PT, DPT

## 2018-12-11 ENCOUNTER — CLINICAL SUPPORT (OUTPATIENT)
Dept: REHABILITATION | Facility: HOSPITAL | Age: 53
End: 2018-12-11
Payer: COMMERCIAL

## 2018-12-11 DIAGNOSIS — M25.511 ACUTE PAIN OF RIGHT SHOULDER: ICD-10-CM

## 2018-12-11 PROCEDURE — 97140 MANUAL THERAPY 1/> REGIONS: CPT

## 2018-12-11 PROCEDURE — 97110 THERAPEUTIC EXERCISES: CPT

## 2018-12-11 NOTE — PROGRESS NOTES
Physical Therapy Daily Treatment Note     Name: Kaleb Rendon Jr.  Clinic Number: [id number]    Therapy Diagnosis:   Encounter Diagnosis   Name Primary?    Acute pain of right shoulder      Physician: Morelia Truong DO    Visit Date: 12/11/2018    Physician Orders: PT Eval and Treat Right Shoulder  Note: Right shoulder ROM and RTC strengthening for R shoulder RTC tendinosis and bursitis. Early signs of adhesive capsulitis on exam.  Medical Diagnosis from Referral: M25.511 (ICD-10-CM) - Right shoulder pain, unspecified chronicity   Evaluation Date: 10/12/2018  Authorization Period Expiration: 10/9/19  Updated Plan of Care Expiration: 01/04/2019  Visit # / Visits authorized: 16/20    Time In: 2:00pm  Time Out: 3:10pm  Total Billable Time: 50 minutes    Precautions: Standard    Subjective     Pt reports: no pain today or following last session. Sees MD next week.  He was compliant with home exercise program.  Response to previous treatment: No complaints  Functional change: N/A    Pain: 0/10  Location: right shoulder      Objective     Kaleb received heat pack for 10 minutes to right shoulder prior to session.    Kaleb received therapeutic exercises to develop strength, endurance, ROM and posture for 40 minutes including:  UBE 3' forward/3' backward level 3.5  Standing BUE frontal raise 3# 2x15  Standing BUE frontal raise 3# 2x15  Sidelying RUE ER 2# x10 with end range over pressure, x10  Sidelying RUE abduction 2# x10 with end range over pressure, x10  Sidelying body blade at 90 abd 2x30 seconds  Supine body blade at 90 degrees flexion 2x30 seconds  Doorway stretch [id number]  Standing IR stretch with towel 10x10 second  Ball bounces red ball 2x30  Wall circles 2x30 in abduction with towel  Standing 90-90 IR YTB 2x15    Not performed:  Wall slides shoulder flexion, abduction stretch with towel 10 second x 10  Standing rows BTB 3x20  Standing ER/IR step-outs BTB 3x20  Standing shoulder extension BTB  3x20  Supine shoulder flexion 5.5# 10 second hold x 15  Prone rows 4# 3x20  Prone shoulder extension 1# 3x20  Prone shoulder abduction 3x15  Serratus wall slides YTB 2x20  Ball circles on wall clockwise/counterclockwise 2x30  Standing upright row with wand 3# 3x15  Standing extension with IR with wand 3# 3x15  Pulley's flexion/IR x 3 min each - NP  Serratus walk-outs, walk-up/downs YTB x20 - NP  Bicep curls 6# 2x15 - NP  Body blade 90 degrees flexion/abduction x 30 seconds each - NP  Standing banded shoulder flexion 2# 2x15 - NP  Serratus punches with wand 3# 2x30 - NP  Banded serratus wall slides with YTB 2x15 - NP  Supine external rotation with wand 10x5 second hold - NP  Wall walks flexion/abduction 10 second x 10 - NP  Standing extension with IR with wand 2x10 - NP    Kaleb received the following manual therapy techniques: Joint mobilizations, prolonged stretching were applied to the: Right shoulder for 15 minutes, including:  Posterior glides, distractions  Shoulder prolonged stretching/PROM flexion, abduction, IR/ER    Kaleb received cold pack for 10 minutes to right shoulder.      Home Exercises Provided and Patient Education Provided     Education provided:   - continue with HEP, ice as needed    Written Home Exercises Provided: Patient instructed to cont prior HEP.  Exercises were reviewed and Kaleb was able to demonstrate them prior to the end of the session.  Kaleb demonstrated good  understanding of the education provided.     See EMR under Media for exercises provided prior visit.    Assessment     Patient demonstrated improved ROM with exercises today. Difficulty with few new exercises.    Kaleb is progressing well towards his goals.   Pt prognosis is Good.     Pt will continue to benefit from skilled outpatient physical therapy to address the deficits listed in the problem list box on initial evaluation, provide pt/family education and to maximize pt's level of independence in the home and  community environment.     Pt's spiritual, cultural and educational needs considered and pt agreeable to plan of care and goals.    Anticipated barriers to physical therapy: None    Goals: Short Term GOALS: 3 weeks  1. Patient demonstrates independence with HEP. (ONGOING)  2. Patient demonstrates increased right shoulder ROM to 150 degrees flexion, 100 degrees abduction, ER 40 degrees, and IR 50 degrees to improve tolerance to functional activities with no more than 2/10 pain. (ONGOING)  3. Patient demonstrates improved overall function per FOTO Shoulder Survey to 20% Limitation or less. (ONGOING)     Long Term GOALS: 6 weeks  1. Patient demonstrates increased right shoulder ROM to 180 degrees flexion/abduction, ER 80 degrees, and IR 70 degrees to improve tolerance to functional activities pain free. (ONGOING)  2. Patient demonstrates increased strength BLE's to 5/5 or greater to improve tolerance to functional activities pain free. (ONGOING)  3. Patient demonstrates improved overall function per FOTO Shoulder Survey to 10% Limitation or less. (ONGOING)    Plan     Continue with POC, progress as tolerated    Ada Eddy, PT, DPT

## 2018-12-14 ENCOUNTER — CLINICAL SUPPORT (OUTPATIENT)
Dept: REHABILITATION | Facility: HOSPITAL | Age: 53
End: 2018-12-14
Payer: COMMERCIAL

## 2018-12-14 DIAGNOSIS — M25.511 ACUTE PAIN OF RIGHT SHOULDER: ICD-10-CM

## 2018-12-14 PROCEDURE — 97110 THERAPEUTIC EXERCISES: CPT

## 2018-12-14 NOTE — PROGRESS NOTES
Physical Therapy Daily Treatment Note     Name: Kaleb Rendon Jr.  Clinic Number: [id number]    Therapy Diagnosis:   Encounter Diagnosis   Name Primary?    Acute pain of right shoulder      Physician: Morelia Truong DO    Visit Date: 12/14/2018    Physician Orders: PT Eval and Treat Right Shoulder  Note: Right shoulder ROM and RTC strengthening for R shoulder RTC tendinosis and bursitis. Early signs of adhesive capsulitis on exam.  Medical Diagnosis from Referral: M25.511 (ICD-10-CM) - Right shoulder pain, unspecified chronicity   Evaluation Date: 10/12/2018  Authorization Period Expiration: 10/9/19  Updated Plan of Care Expiration: 01/04/2019  Visit # / Visits authorized: 17/20    Time In: 2:05pm  Time Out: 3:05pm  Total Billable Time: 40 minutes    Precautions: Standard    Subjective     Pt reports: no pain today or following last session.  He was compliant with home exercise program.  Response to previous treatment: No complaints  Functional change: N/A    Pain: 0/10  Location: right shoulder      Objective     Kaleb received heat pack for 10 minutes to right shoulder prior to session.    Kaleb received therapeutic exercises to develop strength, endurance, ROM and posture for 30 minutes including:  UBE 3' forward/3' backward level 3.5  Standing rows BTB 3x20  Standing ER/IR step-outs BTB 3x20  Standing shoulder extension BTB 3x20  Doorway stretch [id number]    Not performed:  Standing BUE frontal raise 3# 2x15  Standing BUE frontal raise 3# 2x15  Sidelying RUE ER 2# x10 with end range over pressure, x10  Sidelying RUE abduction 2# x10 with end range over pressure, x10  Sidelying body blade at 90 abd 2x30 seconds  Supine body blade at 90 degrees flexion 2x30 seconds  Wall slides shoulder flexion, abduction stretch with towel 10 second x 10  Standing IR stretch with towel 10x10 second  Ball bounces red ball 2x30  Wall circles 2x30 in abduction with towel  Standing 90-90 IR YTB 2x15  Supine shoulder flexion  5.5# 10 second hold x 15  Prone rows 4# 3x20  Prone shoulder extension 1# 3x20  Prone shoulder abduction 3x15  Serratus wall slides YTB 2x20  Ball circles on wall clockwise/counterclockwise 2x30  Standing upright row with wand 3# 3x15  Standing extension with IR with wand 3# 3x15  Pulley's flexion/IR x 3 min each - NP  Serratus walk-outs, walk-up/downs YTB x20 - NP  Bicep curls 6# 2x15 - NP  Body blade 90 degrees flexion/abduction x 30 seconds each - NP  Standing banded shoulder flexion 2# 2x15 - NP  Serratus punches with wand 3# 2x30 - NP  Banded serratus wall slides with YTB 2x15 - NP  Supine external rotation with wand 10x5 second hold - NP  Wall walks flexion/abduction 10 second x 10 - NP  Standing extension with IR with wand 2x10 - NP    Kaleb received the following manual therapy techniques: Joint mobilizations, prolonged stretching were applied to the: Right shoulder for 10 minutes, including:  Posterior glides, distractions  Shoulder prolonged stretching/PROM flexion, abduction, IR/ER    Kaleb received cold pack for 10 minutes to right shoulder.    Shoulder Right Left   Flexion 152 180   Abduction 155 180   ER T3 T6   IR L3, tight T10      Shoulder Right Left   Flexion 180 180   Abduction 180 180   ER at 90 60, pain 80   IR at 90 55 70     Upper Extremity Strength     Shoulder Right Left   Shoulder flexion: 4/5 4+/5   Shoulder Abduction: 4-/5 4+/5   Shoulder ER 4-/5 4+/5   Shoulder IR 4/5 4+/5         Home Exercises Provided and Patient Education Provided     Education provided:   - continue with HEP, ice as needed    Written Home Exercises Provided: Patient instructed to cont prior HEP.  Exercises were reviewed and Kaleb was able to demonstrate them prior to the end of the session.  Kaleb demonstrated good  understanding of the education provided.     See EMR under Media for exercises provided prior visit.    Assessment     Patient demonstrated improved ROM compared to last assessment. Continues to have  soft tissue limitations and weakness but has improved with joint mobility and A/PROM.    Kaleb is progressing well towards his goals.   Pt prognosis is Good.     Pt will continue to benefit from skilled outpatient physical therapy to address the deficits listed in the problem list box on initial evaluation, provide pt/family education and to maximize pt's level of independence in the home and community environment.     Pt's spiritual, cultural and educational needs considered and pt agreeable to plan of care and goals.    Anticipated barriers to physical therapy: None    Goals: Short Term GOALS: 3 weeks  1. Patient demonstrates independence with HEP. (ONGOING)  2. Patient demonstrates increased right shoulder ROM to 150 degrees flexion, 100 degrees abduction, ER 40 degrees, and IR 50 degrees to improve tolerance to functional activities with no more than 2/10 pain. (ONGOING)  3. Patient demonstrates improved overall function per FOTO Shoulder Survey to 20% Limitation or less. (ONGOING)     Long Term GOALS: 6 weeks  1. Patient demonstrates increased right shoulder ROM to 180 degrees flexion/abduction, ER 80 degrees, and IR 70 degrees to improve tolerance to functional activities pain free. (ONGOING)  2. Patient demonstrates increased strength BLE's to 5/5 or greater to improve tolerance to functional activities pain free. (ONGOING)  3. Patient demonstrates improved overall function per FOTO Shoulder Survey to 10% Limitation or less. (ONGOING)    Plan     Continue with POC, progress as tolerated    Ada Eddy, PT, DPT

## 2018-12-17 ENCOUNTER — CLINICAL SUPPORT (OUTPATIENT)
Dept: REHABILITATION | Facility: HOSPITAL | Age: 53
End: 2018-12-17
Payer: COMMERCIAL

## 2018-12-17 DIAGNOSIS — M25.511 ACUTE PAIN OF RIGHT SHOULDER: ICD-10-CM

## 2018-12-17 PROCEDURE — 97110 THERAPEUTIC EXERCISES: CPT

## 2018-12-17 PROCEDURE — 97140 MANUAL THERAPY 1/> REGIONS: CPT

## 2018-12-17 NOTE — PROGRESS NOTES
Physical Therapy Daily Treatment Note     Name: Kaleb Rendon Jr.  Clinic Number: [id number]    Therapy Diagnosis:   Encounter Diagnosis   Name Primary?    Acute pain of right shoulder      Physician: Morelia Truong DO    Visit Date: 12/17/2018    Physician Orders: PT Eval and Treat Right Shoulder  Note: Right shoulder ROM and RTC strengthening for R shoulder RTC tendinosis and bursitis. Early signs of adhesive capsulitis on exam.  Medical Diagnosis from Referral: M25.511 (ICD-10-CM) - Right shoulder pain, unspecified chronicity   Evaluation Date: 10/12/2018  Authorization Period Expiration: 10/9/19  Updated Plan of Care Expiration: 01/04/2019  Visit # / Visits authorized: 18/20    Time In: 4:05pm  Time Out: 5:10pm  Total Billable Time: 45 minutes    Precautions: Standard    Subjective     Pt reports: no pain today but reported some soreness following session.  He was compliant with home exercise program.  Response to previous treatment: No complaints  Functional change: N/A    Pain: 0/10  Location: right shoulder      Objective     Kaleb received heat pack for 10 minutes to right shoulder prior to session.    Kaleb received therapeutic exercises to develop strength, endurance, ROM and posture for 30 minutes including:  UBE 3' forward/3' backward level 3.5  Wall slides shoulder flexion, abduction stretch with towel 10 second x 15  Standing IR stretch with towel 15x10 second  Standing shoulder press 2# 2x15  Standing BUE frontal raise 3# 2x15  Standing BUE frontal raise 3# 2x15  Sidelying RUE ER 2# 2x15  Sidelying RUE abduction 2# 2x15  Supine serratus punch 2# 2x15  Sleeper stretch x60 seconds      Not performed:  Standing rows BTB 3x20  Standing ER/IR step-outs BTB 3x20  Standing shoulder extension BTB 3x20  Doorway stretch [id number]  Sidelying body blade at 90 abd 2x30 seconds  Supine body blade at 90 degrees flexion 2x30 seconds  Ball bounces red ball 2x30  Wall circles 2x30 in abduction with  towel  Standing 90-90 IR YTB 2x15  Supine shoulder flexion 5.5# 10 second hold x 15  Prone rows 4# 3x20  Prone shoulder extension 1# 3x20  Prone shoulder abduction 3x15  Serratus wall slides YTB 2x20  Ball circles on wall clockwise/counterclockwise 2x30  Standing upright row with wand 3# 3x15  Standing extension with IR with wand 3# 3x15  Pulley's flexion/IR x 3 min each - NP  Serratus walk-outs, walk-up/downs YTB x20 - NP  Bicep curls 6# 2x15 - NP  Body blade 90 degrees flexion/abduction x 30 seconds each - NP  Standing banded shoulder flexion 2# 2x15 - NP  Serratus punches with wand 3# 2x30 - NP  Banded serratus wall slides with YTB 2x15 - NP  Supine external rotation with wand 10x5 second hold - NP  Wall walks flexion/abduction 10 second x 10 - NP  Standing extension with IR with wand 2x10 - NP    Kaleb received the following manual therapy techniques: Joint mobilizations, prolonged stretching were applied to the: Right shoulder for 15 minutes, including:  Posterior glides, distractions  Shoulder prolonged stretching/PROM flexion, abduction, IR/ER    Kaleb received cold pack for 10 minutes to right shoulder.      Home Exercises Provided and Patient Education Provided     Education provided:   - continue with HEP, ice as needed    Written Home Exercises Provided: Patient instructed to cont prior HEP.  Exercises were reviewed and Kaleb was able to demonstrate them prior to the end of the session.  Kaleb demonstrated good  understanding of the education provided.     See EMR under Media for exercises provided prior visit.    Assessment     Patient demonstrated improved PROM, sees MD tomorrow.    Kaleb is progressing well towards his goals.   Pt prognosis is Good.     Pt will continue to benefit from skilled outpatient physical therapy to address the deficits listed in the problem list box on initial evaluation, provide pt/family education and to maximize pt's level of independence in the home and community  environment.     Pt's spiritual, cultural and educational needs considered and pt agreeable to plan of care and goals.    Anticipated barriers to physical therapy: None    Goals: Short Term GOALS: 3 weeks  1. Patient demonstrates independence with HEP. (ONGOING)  2. Patient demonstrates increased right shoulder ROM to 150 degrees flexion, 100 degrees abduction, ER 40 degrees, and IR 50 degrees to improve tolerance to functional activities with no more than 2/10 pain. (ONGOING)  3. Patient demonstrates improved overall function per FOTO Shoulder Survey to 20% Limitation or less. (ONGOING)     Long Term GOALS: 6 weeks  1. Patient demonstrates increased right shoulder ROM to 180 degrees flexion/abduction, ER 80 degrees, and IR 70 degrees to improve tolerance to functional activities pain free. (ONGOING)  2. Patient demonstrates increased strength BLE's to 5/5 or greater to improve tolerance to functional activities pain free. (ONGOING)  3. Patient demonstrates improved overall function per FOTO Shoulder Survey to 10% Limitation or less. (ONGOING)    Plan     Continue with POC, progress as tolerated    Ada Eddy, PT, DPT

## 2018-12-18 ENCOUNTER — OFFICE VISIT (OUTPATIENT)
Dept: SPORTS MEDICINE | Facility: CLINIC | Age: 53
End: 2018-12-18
Payer: COMMERCIAL

## 2018-12-18 VITALS
DIASTOLIC BLOOD PRESSURE: 94 MMHG | SYSTOLIC BLOOD PRESSURE: 137 MMHG | BODY MASS INDEX: 27.43 KG/M2 | HEART RATE: 78 BPM | WEIGHT: 207 LBS | HEIGHT: 73 IN

## 2018-12-18 DIAGNOSIS — M99.01 CERVICAL (NECK) REGION SOMATIC DYSFUNCTION: ICD-10-CM

## 2018-12-18 DIAGNOSIS — M67.813 TENDINOSIS OF RIGHT SHOULDER: ICD-10-CM

## 2018-12-18 DIAGNOSIS — M99.08 SOMATIC DYSFUNCTION OF RIB CAGE REGION: ICD-10-CM

## 2018-12-18 DIAGNOSIS — M99.00 SOMATIC DYSFUNCTION OF HEAD REGION: ICD-10-CM

## 2018-12-18 DIAGNOSIS — M79.10 MYALGIA: ICD-10-CM

## 2018-12-18 DIAGNOSIS — M99.02 SOMATIC DYSFUNCTION OF THORACIC REGION: ICD-10-CM

## 2018-12-18 DIAGNOSIS — M25.511 RIGHT SHOULDER PAIN, UNSPECIFIED CHRONICITY: Primary | ICD-10-CM

## 2018-12-18 PROCEDURE — 3008F BODY MASS INDEX DOCD: CPT | Mod: CPTII,S$GLB,, | Performed by: NEUROMUSCULOSKELETAL MEDICINE & OMM

## 2018-12-18 PROCEDURE — 98926 OSTEOPATH MANJ 3-4 REGIONS: CPT | Mod: S$GLB,,, | Performed by: NEUROMUSCULOSKELETAL MEDICINE & OMM

## 2018-12-18 PROCEDURE — 99213 OFFICE O/P EST LOW 20 MIN: CPT | Mod: 25,S$GLB,, | Performed by: NEUROMUSCULOSKELETAL MEDICINE & OMM

## 2018-12-18 PROCEDURE — 99999 PR PBB SHADOW E&M-EST. PATIENT-LVL III: CPT | Mod: PBBFAC,,, | Performed by: NEUROMUSCULOSKELETAL MEDICINE & OMM

## 2018-12-18 PROCEDURE — 3075F SYST BP GE 130 - 139MM HG: CPT | Mod: CPTII,S$GLB,, | Performed by: NEUROMUSCULOSKELETAL MEDICINE & OMM

## 2018-12-18 PROCEDURE — 3080F DIAST BP >= 90 MM HG: CPT | Mod: CPTII,S$GLB,, | Performed by: NEUROMUSCULOSKELETAL MEDICINE & OMM

## 2018-12-18 NOTE — PROGRESS NOTES
"Subjective:     Kaleb Rendon Jr.    Chief Complaint   Patient presents with    Right Shoulder - Follow-up       HPI    Kaleb is a 53 y.o. male coming in today for right shoulder pain.Since last visit the pain has Improved. Pt. describes the pain as a 0/10. He denies any pain currently unless he pushed his ROM in PT. He has 2 more visits left. Feels his ROM is improving. There has not been any new a fall/injury/ or traumas since last visit.  Pt. denies any new musculoskeletal complaints at this time.     Joint instability? no  Mechanical locking/clicking? no  Affecting ADL's? Yes- mild difficulty with reaching behind back, i.e. tucking in shirt  Affecting sleep? no    Review of Systems   Constitutional: Negative for chills and fever.   Musculoskeletal: Negative for back pain, falls, joint pain, myalgias and neck pain.   Neurological: Negative for dizziness, tingling, focal weakness, weakness and headaches.       PAST MEDICAL HISTORY:   Past Medical History:   Diagnosis Date    Colon polyp     Hypertension      PAST SURGICAL HISTORY:   Past Surgical History:   Procedure Laterality Date    COLONOSCOPY      COLONOSCOPY N/A 1/24/2017    Procedure: COLONOSCOPY;  Surgeon: DIETER Goldsmith MD;  Location: [facility] ([address] [place] [address]);  Service: Endoscopy;  Laterality: N/A;    COLONOSCOPY N/A 1/24/2017    Performed by DIETER Goldsmith MD at [facility] ([address] [place] [address])    COLONOSCOPY W/ POLYPECTOMY      HERNIA REPAIR         MEDICATIONS:   Current Outpatient Medications:     amlodipine-benazepril 5-10 mg (LOTREL) 5-10 mg per capsule, Take 1 capsule by mouth once daily., Disp: , Rfl: 11  ALLERGIES: Review of patient's allergies indicates:  No Known Allergies      Objective:     VITAL SIGNS: BP (!) 137/94   Pulse 78   Ht 6' 1" (1.854 m)   Wt 93.9 kg (207 lb)   BMI 27.31 kg/m²    General    Vitals reviewed.  Constitutional: He is oriented to person, place, and time. He appears well-developed and well-nourished.   Neurological: He " is alert and oriented to person, place, and time.   Psychiatric: He has a normal mood and affect. His behavior is normal.               MUSCULOSKELETAL EXAM  Shoulder: right SHOULDER  The affected shoulder is compared to the contralateral shoulder.    Observation:    CERVICAL SPINE  Anterior head carriage.Normal thoracic kyphosis.    SHOULDER  No ecchymosis, edema, or erythema throughout the shoulder girdle.  No sternal, clavicular, or acromial deformities bilaterally.  No atrophy of the pectorals, deltoids, supraspinatus, infraspinatus, or biceps bilaterally.  Shoulder heights symmetric  Gait: Non-antalgic     ROM:  CERVICAL SPINE  Full AROM in flexion, extension, sidebending, and rotation without tenderness.     SHOULDER(* = with pain)  Active flexion to 180° on left and 180° on right  Active abduction to 180° on left and 180° on right   Active internal rotation to T7 on left and T12 on right *   Active external rotation to T4 on left and T3 on right    No scapular dyskinesia or winging.     Tenderness:  No tenderness at the AC joint  No tenderness over the clavicle   No tenderness over biceps tendon or bicipital groove  No tenderness over subacromial space  No tenderness over RTC footprint at greater tubercle of humerus     Strength Testing (* = with pain):  Deltoid - 5/5 on left and 5/5 on right  Biceps - 5/5 on left and 5/5 on right  Triceps - 5/5 on left and 5/5 on right  Wrist extension - 5/5 on left and 5/5 on right  Wrist flexion - 5/5 on left and 5/5 on right   - 5/5 on left and 5/5 on right  Finger extension - 5/5 on left and 5/5 on right  Finger abduction - 5/5 on left and 5/5 on right  Scaption- 5-/5 on right, 5/5 on left  IR- 5/5 on right, 5/5 on left  External rotation- 5-/5 on right, 5/5 on left     Special Tests:  Empty can test - negative  Full can test - negative  Bear hug test - negative  Resisted internal rotation - negative  Resisted external rotation - negative     Neer's test -  negative  Hawkin's-Cecilio test - negative  Cross-arm test- negative     OKeiths test - negative      Sulcus sign - none  AP load and shift laxity - none     Speed's test - negative  Yergason's test - negative      TART (Tissue texture abnormality, Asymmetry,  Restriction of motion and/or Tenderness) changes:    Head: Occipitoatlantal (OA) Joint ES-left, R-right     Cervical Spine   C1 Neutral   C2 FRS LEFT   C3 FRS LEFT   C4 Neutral   C5 Neutral   C6 Neutral   C7 FRS LEFT      Thoracic Spine   T1 ERS LEFT   T2 Neutral   T3 Neutral   T4 Neutral   T5 Neutral   T6 Neutral   T7 Neutral   T8 Neutral   T9 Neutral   T10 Neutral   T11 Neutral   T12 Neutral     Rib cage: R1 inhaled on left      Key   F= Flexed   E = Extended   R = Rotated   S = Sidebent   TTA = tissue texture abnormality         Assessment:      Encounter Diagnoses   Name Primary?    Right shoulder pain, unspecified chronicity Yes    Tendinosis of right shoulder     Myalgia     Somatic dysfunction of head region     Cervical (neck) region somatic dysfunction     Somatic dysfunction of thoracic region     Somatic dysfunction of rib cage region           Plan:     1. Right shoulder pain has continued to improved with PT. Still lacking some internal rotation ROM on physical exam today, but functionally back to baseline.   - Supraspinatus tendinosis with subacromial bursitis shown on MRI from 10/8/18   - Complete PT and continue HEP- stressed the importance of continuing HEP following completion of PT   - OMT performed again today to address contributing biomechanical restrictions  -  Supine Thoracic extension exercise added to HEP: 10-15 reps, twice daily. Handout also given.     2. OMT 3-4 regions. Oral consent obtained.  Reviewed benefits and potential side effects, including bruising at site of treatment and increased soreness for the next 24-48 hours. Pt. Instructed to increased water intake by 1 L today and tomorrow to help with any residual  soreness.   - OMT indicated today due to signs and symptoms as well as local and remote somatic dysfunction findings and their related neurokinetic, lymphatic, fascial and/or arteriovenous body connections.   - OMT techniques used: [employer] and Still's Technique   - Treatment was tolerated well. Improvement noted in segmental mobility post-treatment in dysfunctional regions. There were no adverse events and no complications immediately following treatment.     3. Follow-up as needed if pain deteriorates or new issue arrises    4. Patient agreeable to today's plan and all questions were answered

## 2020-01-06 PROBLEM — Z13.9 ENCOUNTER FOR HEALTH-RELATED SCREENING: Status: RESOLVED | Noted: 2017-01-24 | Resolved: 2020-01-06

## 2021-06-15 ENCOUNTER — CLINICAL SUPPORT (OUTPATIENT)
Dept: OTHER | Facility: CLINIC | Age: 56
End: 2021-06-15
Payer: COMMERCIAL

## 2021-06-15 DIAGNOSIS — Z00.8 ENCOUNTER FOR OTHER GENERAL EXAMINATION: ICD-10-CM

## 2021-07-06 VITALS — HEIGHT: 73 IN | BODY MASS INDEX: 27.31 KG/M2

## 2021-07-06 LAB
HDLC SERPL-MCNC: 26 MG/DL
POC CHOLESTEROL, LDL (DOCK): 152 MG/DL
POC CHOLESTEROL, TOTAL: 223 MG/DL
POC GLUCOSE, FASTING: 100 MG/DL (ref 60–110)
TRIGL SERPL-MCNC: 221 MG/DL

## 2022-03-12 ENCOUNTER — CLINICAL SUPPORT (OUTPATIENT)
Dept: OTHER | Facility: CLINIC | Age: 57
End: 2022-03-12
Payer: COMMERCIAL

## 2022-03-12 DIAGNOSIS — Z00.8 ENCOUNTER FOR OTHER GENERAL EXAMINATION: ICD-10-CM

## 2022-03-15 LAB
HDLC SERPL-MCNC: 29 MG/DL
POC CHOLESTEROL, LDL (DOCK): 154.8 MG/DL
POC CHOLESTEROL, TOTAL: 221 MG/DL
POC GLUCOSE, FASTING: 108 MG/DL (ref 60–110)
TRIGL SERPL-MCNC: 186 MG/DL

## 2023-03-25 ENCOUNTER — CLINICAL SUPPORT (OUTPATIENT)
Dept: OTHER | Facility: CLINIC | Age: 58
End: 2023-03-25
Payer: COMMERCIAL

## 2023-03-25 DIAGNOSIS — Z00.8 ENCOUNTER FOR OTHER GENERAL EXAMINATION: ICD-10-CM

## 2023-03-29 VITALS
WEIGHT: 230 LBS | SYSTOLIC BLOOD PRESSURE: 144 MMHG | DIASTOLIC BLOOD PRESSURE: 87 MMHG | HEIGHT: 73 IN | BODY MASS INDEX: 30.48 KG/M2

## 2023-03-29 LAB
HDLC SERPL-MCNC: 30 MG/DL
POC CHOLESTEROL, LDL (DOCK): 146 MG/DL
POC CHOLESTEROL, TOTAL: 213 MG/DL
POC GLUCOSE, FASTING: 117 MG/DL (ref 60–110)
TRIGL SERPL-MCNC: 201 MG/DL

## 2024-02-23 ENCOUNTER — OFFICE VISIT (OUTPATIENT)
Dept: DERMATOLOGY | Facility: CLINIC | Age: 59
End: 2024-02-23
Payer: COMMERCIAL

## 2024-02-23 DIAGNOSIS — L57.8 ACTINIC ELASTOSIS: ICD-10-CM

## 2024-02-23 DIAGNOSIS — L57.0 ACTINIC KERATOSIS: Primary | ICD-10-CM

## 2024-02-23 PROCEDURE — 17000 DESTRUCT PREMALG LESION: CPT | Mod: S$GLB,,, | Performed by: DERMATOLOGY

## 2024-02-23 PROCEDURE — 17003 DESTRUCT PREMALG LES 2-14: CPT | Mod: S$GLB,,, | Performed by: DERMATOLOGY

## 2024-02-23 PROCEDURE — 1160F RVW MEDS BY RX/DR IN RCRD: CPT | Mod: CPTII,S$GLB,, | Performed by: DERMATOLOGY

## 2024-02-23 PROCEDURE — 99203 OFFICE O/P NEW LOW 30 MIN: CPT | Mod: 25,S$GLB,, | Performed by: DERMATOLOGY

## 2024-02-23 PROCEDURE — 1159F MED LIST DOCD IN RCRD: CPT | Mod: CPTII,S$GLB,, | Performed by: DERMATOLOGY

## 2024-02-23 PROCEDURE — 4010F ACE/ARB THERAPY RXD/TAKEN: CPT | Mod: CPTII,S$GLB,, | Performed by: DERMATOLOGY

## 2024-02-23 RX ORDER — AMLODIPINE AND BENAZEPRIL HYDROCHLORIDE 5; 20 MG/1; MG/1
1 CAPSULE ORAL
COMMUNITY
Start: 2024-01-11 | End: 2024-02-23

## 2024-02-23 NOTE — PROGRESS NOTES
Patient Information  Name: Kaleb Rendon  : 1965  MRN: [id number]     Referring Physician:  Self   Primary Care Physician:  Matilda, Primary Doctor   Date of Visit: 2024      Subjective:     History of Present lllness:    Kaleb Rendon is a 58 y.o. male who presents with a chief complaint of lesion.  Location: face  Duration: months  Signs/Symptoms: pink, scaly spots  Exacerbating factors: none  Relieving factors/Prior treatments: none    Clinical documentation obtained by nursing staff reviewed.    Review of Systems    Objective:   Physical Exam   Constitutional: He appears well-developed and well-nourished. No distress.   Neurological: He is alert and oriented to person, place, and time. He is not disoriented.   Psychiatric: He has a normal mood and affect.   Skin:   Areas Examined (abnormalities noted in diagram):   Head / Face Inspection Performed            Diagram Legend     Erythematous scaling macule/papule c/w actinic keratosis       Vascular papule c/w angioma      Pigmented verrucoid papule/plaque c/w seborrheic keratosis      Yellow umbilicated papule c/w sebaceous hyperplasia      Irregularly shaped tan macule c/w lentigo     1-2 mm smooth white papules consistent with Milia      Movable subcutaneous cyst with punctum c/w epidermal inclusion cyst      Subcutaneous movable cyst c/w pilar cyst      Firm pink to brown papule c/w dermatofibroma      Pedunculated fleshy papule(s) c/w skin tag(s)      Evenly pigmented macule c/w junctional nevus     Mildly variegated pigmented, slightly irregular-bordered macule c/w mildly atypical nevus      Flesh colored to evenly pigmented papule c/w intradermal nevus       Pink pearly papule/plaque c/w basal cell carcinoma      Erythematous hyperkeratotic cursted plaque c/w SCC      Surgical scar with no sign of skin cancer recurrence      Open and closed comedones      Inflammatory papules and pustules      Verrucoid papule consistent consistent with wart      Erythematous eczematous patches and plaques     Dystrophic onycholytic nail with subungual debris c/w onychomycosis     Umbilicated papule    Erythematous-base heme-crusted tan verrucoid plaque consistent with inflamed seborrheic keratosis     Erythematous Silvery Scaling Plaque c/w Psoriasis     See annotation    No images are attached to the encounter or orders placed in the encounter.      [] Data reviewed  [] Prior external notes reviewed  [] Independent review of test  [] Management discussed with another provider  [] Independent historian    Assessment / Plan:        Actinic keratosis  Actinic keratoses (AK) are common precancerous skin lesions due to sun damage which builds up in unprotected skin over the years. Although they often persist as chronic skin lesions, AKs can evolve into skin cancer (squamous cell carcinoma [SCC]) if left untreated.   If you have a precancerous skin growth, protecting your skin from the sun offers two big benefits: 1) It helps to prevent the sun from causing further damage to your skin, which could lead to more precancerous growths or skin cancer, and 2) It gives your body a chance to repair some of the damage to your skin.    Cryosurgery procedure note:  Risk, benefits, and alternatives of cryosurgery are discussed with the patient, including but not limited to the risks of hypopigmentation, hyperpigmentation, scar, infection, recurrence of lesion(s), development of new lesion(s), and need for additional treatment of the lesion(s). Verbal consent obtained from patient. Liquid nitrogen cryosurgery applied to 2 lesion(s) to produce a freeze injury. Counseled patient that blisters may form, and instructed patient on wound care with gentle cleansing and use of Vaseline ointment to keep moist until healed. Handout was provided, and patient was instructed to return to clinic in 1-2 months if lesions do not completely resolve.    Actinic elastosis  - chronic problem, stable  Actinic  elastosis affects people who have had long-term sun exposure and sun damage.  Everyday use a broad-spectrum, water-resistant sunscreen with SPF of 30 or higher--reapply every 2 hours. Seek shade and wear sun-protective clothing/hat.      Follow up in about 1 year (around 2/23/2025).      Saray Villeda MD, FAAD  [facility]

## 2024-02-23 NOTE — PATIENT INSTRUCTIONS
CRYOSURGERY      Your doctor has used a method called cryosurgery to treat your skin condition. Cryosurgery refers to the use of very cold substances to treat a variety of skin conditions such as warts, precancerous skin lesions, molluscum contagiosum, sun spots, and several benign growths. The substance we use in cryosurgery is liquid nitrogen and is so cold (-195 degrees Celsius) that it burns when administered.     Following treatment in the office, the skin may immediately itch or burn and become red. You may find the area around the lesion is affected as well. It is sometimes necessary to treat not only the lesion, but also a small area of the surrounding normal skin to achieve a good response.     A blister, and even a blood-filled blister, may form after treatment.   This is a normal response. If the blister is painful, it is acceptable to sterilize a needle with rubbing alcohol and gently pop the blister. It is important that you gently wash the area with soap and warm water as the blister fluid may contain wart virus if a wart was treated. Do not remove the roof of the blister.     The area treated can take anywhere from 1-3 weeks to heal. Healing time depends on the kind of skin lesion treated, the location, and how aggressively the lesion was treated. It is recommended that the areas treated are covered with Vaseline and a bandaid to improve healing. If a bandaid is not practical, Vaseline applied several times per day will do. Keeping these areas moist will speed the healing time.    Treatment with liquid nitrogen can leave a scar. In dark skin, it may be a light or dark scar; in light skin it may be a white or pink scar. These will generally fade with time, but may never go away completely.     If you have any concerns after your treatment, please message us via [facility] or call us at [phone].

## 2024-03-23 ENCOUNTER — CLINICAL SUPPORT (OUTPATIENT)
Dept: OTHER | Facility: CLINIC | Age: 59
End: 2024-03-23

## 2024-03-23 DIAGNOSIS — Z00.8 ENCOUNTER FOR OTHER GENERAL EXAMINATION: ICD-10-CM

## 2024-03-27 VITALS
SYSTOLIC BLOOD PRESSURE: 130 MMHG | BODY MASS INDEX: 29.55 KG/M2 | DIASTOLIC BLOOD PRESSURE: 83 MMHG | HEIGHT: 73 IN | WEIGHT: 223 LBS

## 2024-03-27 LAB
HDLC SERPL-MCNC: 26 MG/DL
POC CHOLESTEROL, LDL (DOCK): 164 MG/DL
POC CHOLESTEROL, TOTAL: 226 MG/DL
POC GLUCOSE, FASTING: 107 MG/DL (ref 60–110)
TRIGL SERPL-MCNC: 191 MG/DL

## 2024-06-06 ENCOUNTER — TELEPHONE (OUTPATIENT)
Dept: ENDOSCOPY | Facility: HOSPITAL | Age: 59
End: 2024-06-06
Payer: COMMERCIAL

## 2024-06-06 NOTE — TELEPHONE ENCOUNTER
----- Message from Winnie Ross sent at 6/6/2024  1:48 PM CDT -----  Regarding: FW: Colonoscopy Scheduling  Contact: [phone]    ----- Message -----  From: Jonna Green  Sent: 6/6/2024   9:13 AM CDT  To: [facility] Endo Schedulers; #  Subject: Colonoscopy Scheduling                           Patient is calling to get scheduled for a colonoscopy. Pt wants to know if he has to be seen by a provider first. Pt stated he called in reference to this 3 weeks ago and still hasn't heard back from any one. Please give pt a call to further discuss and get him scheduled.

## 2024-06-12 ENCOUNTER — TELEPHONE (OUTPATIENT)
Dept: ENDOSCOPY | Facility: HOSPITAL | Age: 59
End: 2024-06-12
Payer: COMMERCIAL

## 2024-06-12 DIAGNOSIS — Z12.11 ENCOUNTER FOR SCREENING COLONOSCOPY: Primary | ICD-10-CM

## 2024-06-12 RX ORDER — POLYETHYLENE GLYCOL 3350, SODIUM SULFATE ANHYDROUS, SODIUM BICARBONATE, SODIUM CHLORIDE, POTASSIUM CHLORIDE 236; 22.74; 6.74; 5.86; 2.97 G/4L; G/4L; G/4L; G/4L; G/4L
4 POWDER, FOR SOLUTION ORAL ONCE
Qty: 4000 ML | Refills: 0 | Status: SHIPPED | OUTPATIENT
Start: 2024-06-12 | End: 2024-06-12

## 2024-06-12 NOTE — TELEPHONE ENCOUNTER
Spoke to pt to schedule procedure(s) Colonoscopy       Physician to perform procedure(s) Dr. DIETER Goldsmith  Date of Procedure (s) 8/27/24  Arrival Time 7:30 AM  Time of Procedure(s) 8:30 AM   Location of Procedure(s) [place] [address]  Type of Rx Prep sent to patient: PEG  Instructions provided to patient via [facility]    Patient was informed on the following information and verbalized understanding. Screening questionnaire reviewed with patient and complete. If procedure requires anesthesia, a responsible adult needs to be present to accompany the patient home, patient cannot drive after receiving anesthesia. Appointment details are tentative, especially check-in time. Patient will receive a prep-op call 7 days prior to confirm check-in time for procedure. If applicable the patient should contact their pharmacy to verify Rx for procedure prep is ready for pick-up. Patient was advised to call the scheduling department at [phone] if pharmacy states no Rx is available. Patient was advised to call the endoscopy scheduling department if any questions or concerns arise.      [facility]oscopy Scheduling Department

## 2024-06-12 NOTE — TELEPHONE ENCOUNTER
----- Message from Winnie Ross sent at 6/6/2024  1:48 PM CDT -----  Regarding: FW: Colonoscopy Scheduling  Contact: [phone]    ----- Message -----  From: Jonna Green  Sent: 6/6/2024   9:13 AM CDT  To: [facility] Endo Schedulers; #  Subject: Colonoscopy Scheduling                           Patient is calling to get scheduled for a colonoscopy. Pt wants to know if he has to be seen by a provider first. Pt stated he called in reference to this 3 weeks ago and still hasn't heard back from any one. Please give pt a call to further discuss and get him scheduled.

## 2024-08-20 ENCOUNTER — PATIENT MESSAGE (OUTPATIENT)
Dept: ENDOSCOPY | Facility: HOSPITAL | Age: 59
End: 2024-08-20
Payer: COMMERCIAL

## 2024-08-20 ENCOUNTER — TELEPHONE (OUTPATIENT)
Dept: ENDOSCOPY | Facility: HOSPITAL | Age: 59
End: 2024-08-20
Payer: COMMERCIAL

## 2024-08-20 NOTE — TELEPHONE ENCOUNTER
Contacted Pt for Endoscopy precall to confirm scheduled procedure(s) Colonoscopy on 8/27/24. Pt did not answer. Left voicemail for pt to call Endoscopy Scheduling to confirm.

## 2024-08-23 ENCOUNTER — TELEPHONE (OUTPATIENT)
Dept: ENDOSCOPY | Facility: HOSPITAL | Age: 59
End: 2024-08-23
Payer: COMMERCIAL

## 2024-08-23 NOTE — TELEPHONE ENCOUNTER
Spoke to pt for pre-call to confirm scheduled Colonoscopy and patient verbalized understanding of the following:       Date of Procedure (s)  verified 8/27/24  Arrival Time 7:30 AM verified.  Location of Procedure(s) [place] [address] verified.  NPO status reinforced. Ok to continue clear liquids up until 4 hours prior to the Endoscopy procedure.   Pt confirmed receipt of prep instructions and Rx prep (if applicable).  Instructions provided to patient via [facility]  Pt confirmed ride home after procedure if procedure requires anesthesia.   Pre-call screening questionnaire reviewed and completed with patient.   Appointment details are tentative, including check-in time.  If the patient begins taking any blood thinning medications, injectable weight loss/diabetes medications (other than insulin), or Adipex (phentermine) patient was instructed to contact the endoscopy scheduling department as soon as possible.  Patient was advised to call the endoscopy scheduling department if any questions or concerns arise.       [facility]oscopy Scheduling Department

## 2024-08-26 ENCOUNTER — ANESTHESIA EVENT (OUTPATIENT)
Dept: ENDOSCOPY | Facility: HOSPITAL | Age: 59
End: 2024-08-26
Payer: COMMERCIAL

## 2024-08-26 NOTE — H&P
COLONOSCOPY HISTORY AND PE    Procedure : Colonoscopy      INDICATIONS: asymptomatic screening exam and personal history of colon polyps      Past Medical History:   Diagnosis Date    Colon polyp     Hypertension        Past Surgical History:   Procedure Laterality Date    COLONOSCOPY      COLONOSCOPY N/A 1/24/2017    Procedure: COLONOSCOPY;  Surgeon: DIETER Goldsmith MD;  Location: [address];  Service: Endoscopy;  Laterality: N/A;    COLONOSCOPY W/ POLYPECTOMY      HERNIA REPAIR         Review of patient's allergies indicates:  No Known Allergies    No current facility-administered medications on file prior to encounter.     Current Outpatient Medications on File Prior to Encounter   Medication Sig Dispense Refill    amlodipine-benazepril 5-10 mg (LOTREL) 5-10 mg per capsule Take 1 capsule by mouth once daily.  11       Family History   Problem Relation Name Age of Onset    Cancer Mother  65        colon cancer    Hyperlipidemia Mother      Diabetes Father      Glaucoma Father      Macular degeneration Father         Social History     Socioeconomic History    Marital status:    Tobacco Use    Smoking status: Never    Smokeless tobacco: Never   Substance and Sexual Activity    Alcohol use: Yes     Comment: social     Drug use: No    Sexual activity: Yes     Partners: Female       Review of Systems -    Respiratory : no cough, shortness of breath, or wheezing  Cardiovascular  no chest pain or dyspnea on exertion  Gastrointestinal no abdominal pain, change in bowel habits, or black or bloody stools  Musculoskeletal no deformities, swelling  Neurological no TIA or stroke symptoms        Physical Exam:  General: NAD  AT NC EOMI  Mallampati Score   Neck supple, trachea midline  Lungs: nl excursions, no retractions.  Breathing comfortably  Abdomen ND soft NT.  No masses  Extremities: No CCE.      ASA:  II    PLAN  COLONOSCOPY.  The details of the procedure, the possible need for biopsy or polypectomy and  the potential risks including bleeding, perforation, missed polyps were discussed in detail.

## 2024-08-27 ENCOUNTER — ANESTHESIA (OUTPATIENT)
Dept: ENDOSCOPY | Facility: HOSPITAL | Age: 59
End: 2024-08-27
Payer: COMMERCIAL

## 2024-08-27 ENCOUNTER — HOSPITAL ENCOUNTER (OUTPATIENT)
Facility: HOSPITAL | Age: 59
Discharge: HOME | End: 2024-08-27
Attending: COLON & RECTAL SURGERY | Admitting: COLON & RECTAL SURGERY
Payer: COMMERCIAL

## 2024-08-27 VITALS
BODY MASS INDEX: 29.16 KG/M2 | RESPIRATION RATE: 20 BRPM | WEIGHT: 220 LBS | HEIGHT: 73 IN | HEART RATE: 66 BPM | TEMPERATURE: 98 F | DIASTOLIC BLOOD PRESSURE: 80 MMHG | SYSTOLIC BLOOD PRESSURE: 131 MMHG | OXYGEN SATURATION: 96 %

## 2024-08-27 DIAGNOSIS — Z12.11 SCREEN FOR COLON CANCER: ICD-10-CM

## 2024-08-27 PROCEDURE — 27201089 HC SNARE, DISP (ANY): Performed by: COLON & RECTAL SURGERY

## 2024-08-27 PROCEDURE — 37000009 HC ANESTHESIA EA ADD 15 MINS: Performed by: COLON & RECTAL SURGERY

## 2024-08-27 PROCEDURE — 88305 TISSUE EXAM BY PATHOLOGIST: CPT | Performed by: PATHOLOGY

## 2024-08-27 PROCEDURE — 45385 COLONOSCOPY W/LESION REMOVAL: CPT | Mod: 33,,, | Performed by: COLON & RECTAL SURGERY

## 2024-08-27 PROCEDURE — 25000003 PHARM REV CODE 250: Performed by: NURSE ANESTHETIST, CERTIFIED REGISTERED

## 2024-08-27 PROCEDURE — 45385 COLONOSCOPY W/LESION REMOVAL: CPT | Mod: 33 | Performed by: COLON & RECTAL SURGERY

## 2024-08-27 PROCEDURE — 63600175 PHARM REV CODE 636 W HCPCS: Performed by: NURSE ANESTHETIST, CERTIFIED REGISTERED

## 2024-08-27 PROCEDURE — 37000008 HC ANESTHESIA 1ST 15 MINUTES: Performed by: COLON & RECTAL SURGERY

## 2024-08-27 PROCEDURE — 88305 TISSUE EXAM BY PATHOLOGIST: CPT | Mod: 26,,, | Performed by: PATHOLOGY

## 2024-08-27 RX ORDER — LIDOCAINE HYDROCHLORIDE 20 MG/ML
INJECTION INTRAVENOUS
Status: DISCONTINUED | OUTPATIENT
Start: 2024-08-27 | End: 2024-08-27

## 2024-08-27 RX ORDER — SODIUM CHLORIDE 9 MG/ML
INJECTION, SOLUTION INTRAVENOUS CONTINUOUS
Status: DISCONTINUED | OUTPATIENT
Start: 2024-08-27 | End: 2024-08-27 | Stop reason: HOSPADM

## 2024-08-27 RX ORDER — PROPOFOL 10 MG/ML
VIAL (ML) INTRAVENOUS
Status: DISCONTINUED | OUTPATIENT
Start: 2024-08-27 | End: 2024-08-27

## 2024-08-27 RX ADMIN — PROPOFOL 90 MG: 10 INJECTION, EMULSION INTRAVENOUS at 08:08

## 2024-08-27 RX ADMIN — LIDOCAINE HYDROCHLORIDE 80 MG: 20 INJECTION INTRAVENOUS at 08:08

## 2024-08-27 RX ADMIN — PROPOFOL 150 MCG/KG/MIN: 10 INJECTION, EMULSION INTRAVENOUS at 08:08

## 2024-08-27 RX ADMIN — SODIUM CHLORIDE: 0.9 INJECTION, SOLUTION INTRAVENOUS at 08:08

## 2024-08-27 NOTE — ANESTHESIA PREPROCEDURE EVALUATION
08/27/2024  Kaleb Rendon is a 59 y.o., male.    Past Medical History:   Diagnosis Date    Colon polyp     Hypertension      Past Surgical History:   Procedure Laterality Date    COLONOSCOPY      COLONOSCOPY N/A 1/24/2017    Procedure: COLONOSCOPY;  Surgeon: DIETER Goldsmith MD;  Location: [address];  Service: Endoscopy;  Laterality: N/A;    COLONOSCOPY W/ POLYPECTOMY      HERNIA REPAIR         Pre-op Assessment    I have reviewed the Patient Summary Reports.    I have reviewed the NPO Status.   I have reviewed the Medications.     Review of Systems  Anesthesia Hx:  No problems with previous Anesthesia             Denies Family Hx of Anesthesia complications.    Denies Personal Hx of Anesthesia complications.                    Hematology/Oncology:  Hematology Normal   Oncology Normal                                   EENT/Dental:  EENT/Dental Normal           Cardiovascular:  Cardiovascular Normal Exercise tolerance: good                                           Renal/:  Renal/ Normal                 Hepatic/GI:  Hepatic/GI Normal                 Musculoskeletal:  Musculoskeletal Normal                Neurological:  Neurology Normal                                      Endocrine:  Endocrine Normal            Dermatological:  Skin Normal    Psych:  Psychiatric Normal                    Physical Exam  General: Well nourished    Airway:  Mallampati: II   Mouth Opening: Normal  TM Distance: Normal  Neck ROM: Normal ROM        Anesthesia Plan  Type of Anesthesia, risks & benefits discussed:    Anesthesia Type: Gen Natural Airway  Intra-op Monitoring Plan: Standard ASA Monitors  Post Op Pain Control Plan: multimodal analgesia  Induction:  IV  Informed Consent: Informed consent signed with the Patient and all parties understand the risks and agree with anesthesia plan.  All questions answered.   ASA  Score: 2  Day of Surgery Review of History & Physical: H&P Update referred to the surgeon/provider.    Ready For Surgery From Anesthesia Perspective.     .

## 2024-08-27 NOTE — TRANSFER OF CARE
"Anesthesia Transfer of Care Note    Patient: Kaleb Rendon    Procedure(s) Performed: Procedure(s) (LRB):  COLONOSCOPY (N/A)    Patient location: [facility]    Anesthesia Type: general    Transport from OR: Transported from OR on room air with adequate spontaneous ventilation    Post pain: adequate analgesia    Post assessment: no apparent anesthetic complications and tolerated procedure well    Post vital signs: stable    Level of consciousness: awake    Nausea/Vomiting: no nausea/vomiting    Complications: none    Transfer of care protocol was followed      Last vitals: Visit Vitals  BP (!) 153/82   Pulse 80   Temp 36.8 °C (98.2 °F)   Resp 16   Ht 6' 1" (1.854 m)   Wt 99.8 kg (220 lb)   SpO2 95%   BMI 29.03 kg/m²     "

## 2024-08-27 NOTE — PLAN OF CARE
Dscharge instructions and Provation reiewed with patient, verbalizes understanding. PIV removed cannula intact dressin applied. Dressing applied. Escorted to lobby with steady gait. NAD noted no c/o pain. Wife Billie in lobby

## 2024-08-27 NOTE — PROVATION PATIENT INSTRUCTIONS
Discharge Summary/Instructions after an Endoscopic Procedure  Patient Name: Kaleb Rendon  Patient MRN: [id number]  Patient YOB: 1965 Tuesday, August 27, 2024  Francisco Goldsmith MD  Dear patient,  As a result of recent federal legislation (The Federal Cures Act), you may   receive lab or pathology results from your procedure in your [facility]   account before your physician is able to contact you. Your physician or   their representative will relay the results to you with their   recommendations at their soonest availability.  Thank you,  RESTRICTIONS:  During your procedure today, you received medications for sedation.  These   medications may affect your judgment, balance and coordination.  Therefore,   for 24 hours, you have the following restrictions:   - DO NOT drive a car, operate machinery, make legal/financial decisions,   sign important papers or drink alcohol.    ACTIVITY:  Today: no heavy lifting, straining or running due to procedural   sedation/anesthesia.  The following day: return to full activity including work.  DIET:  Eat and drink normally unless instructed otherwise.     TREATMENT FOR COMMON SIDE EFFECTS:  - Mild abdominal pain, nausea, belching, bloating or excessive gas:  rest,   eat lightly and use a heating pad.  - Sore Throat: treat with throat lozenges and/or gargle with warm salt   water.  - Because air was used during the procedure, expelling large amounts of air   from your rectum or belching is normal.  - If a bowel prep was taken, you may not have a bowel movement for 1-3 days.    This is normal.  SYMPTOMS TO WATCH FOR AND REPORT TO YOUR PHYSICIAN:  1. Abdominal pain or bloating, other than gas cramps.  2. Chest pain.  3. Back pain.  4. Signs of infection such as: chills or fever occurring within 24 hours   after the procedure.  5. Rectal bleeding, which would show as bright red, maroon, or black stools.   (A tablespoon of blood from the rectum is not serious, especially if    hemorrhoids are present.)  6. Vomiting.  7. Weakness or dizziness.  GO DIRECTLY TO THE NEAREST EMERGENCY ROOM IF YOU HAVE ANY OF THE FOLLOWING:      Difficulty breathing              Chills and/or fever over 101 F   Persistent vomiting and/or vomiting blood   Severe abdominal pain   Severe chest pain   Black, tarry stools   Bleeding- more than one tablespoon   Any other symptom or condition that you feel may need urgent attention  Your doctor recommends these additional instructions:  If any biopsies were taken, your doctors clinic will contact you in 1 to 2   weeks with any results.  - Discharge patient to home (ambulatory).   - Patient has a contact number available for emergencies.  The signs and   symptoms of potential delayed complications were discussed with the   patient.  Return to normal activities tomorrow.  Written discharge   instructions were provided to the patient.   - Resume regular diet.   - Continue present medications.   - Repeat colonoscopy in 5 years for surveillance.  For questions, problems or results please call your physician - Francisco Goldsmith MD at Work:  [phone].  [facility], EMERGENCY ROOM PHONE NUMBER: [phone]  IF A COMPLICATION OR EMERGENCY SITUATION ARISES AND YOU ARE UNABLE TO REACH   YOUR PHYSICIAN - GO DIRECTLY TO THE EMERGENCY ROOM.  Francisco Goldsmith MD  8/27/2024 8:50:33 AM  This report has been verified and signed electronically.  Dear patient,  As a result of recent federal legislation (The Federal Cures Act), you may   receive lab or pathology results from your procedure in your [facility]   account before your physician is able to contact you. Your physician or   their representative will relay the results to you with their   recommendations at their soonest availability.  Thank you,  PROVATION

## 2024-08-27 NOTE — ANESTHESIA POSTPROCEDURE EVALUATION
Anesthesia Post Evaluation    Patient: Kaleb Rendon    Procedure(s) Performed: Procedure(s) (LRB):  COLONOSCOPY (N/A)    Final Anesthesia Type: general      Patient location during evaluation: GI PACU  Patient participation: Yes- Able to Participate  Level of consciousness: awake and alert  Post-procedure vital signs: reviewed and stable  Pain management: adequate  Airway patency: patent    PONV status at discharge: No PONV  Anesthetic complications: no      Cardiovascular status: stable  Respiratory status: unassisted and spontaneous ventilation  Hydration status: euvolemic  Follow-up not needed.              Vitals Value Taken Time   /80 08/27/24 0921   Temp  08/27/24 0939   Pulse 66 08/27/24 0921   Resp 20 08/27/24 0921   SpO2 96 % 08/27/24 0921         Event Time   Out of Recovery 09:33:00         Pain/Natalie Score: Natalie Score: 10 (8/27/2024  8:50 AM)

## 2024-08-29 LAB
FINAL PATHOLOGIC DIAGNOSIS: NORMAL
GROSS: NORMAL
Lab: NORMAL

## 2024-09-01 NOTE — PROGRESS NOTES
Adenoma or history of polyps    Repeat colonoscopy in 5 years       If you have any questions or if I can clarify any of the above please contact me:    EPIC message  Mobile [phone]   Pager [phone]  email [email or web address]      Sincerely  HFrancisco MD, FACS, FASCRS  Staff Surgeon  Dept of Colon and Rectal Surgery

## 2025-04-05 ENCOUNTER — LAB REQUISITION (OUTPATIENT)
Dept: LAB | Facility: HOSPITAL | Age: 60
End: 2025-04-05
Payer: COMMERCIAL

## 2025-04-05 ENCOUNTER — CLINICAL SUPPORT (OUTPATIENT)
Dept: OTHER | Facility: CLINIC | Age: 60
End: 2025-04-05

## 2025-04-05 DIAGNOSIS — Z00.8 ENCOUNTER FOR OTHER GENERAL EXAMINATION: ICD-10-CM

## 2025-04-05 LAB — PSA SERPL-MCNC: 1.96 NG/ML

## 2025-04-05 PROCEDURE — 84153 ASSAY OF PSA TOTAL: CPT | Performed by: INTERNAL MEDICINE

## 2025-04-08 VITALS
HEIGHT: 73 IN | SYSTOLIC BLOOD PRESSURE: 136 MMHG | WEIGHT: 232 LBS | BODY MASS INDEX: 30.75 KG/M2 | DIASTOLIC BLOOD PRESSURE: 82 MMHG

## 2025-04-08 LAB
HDLC SERPL-MCNC: 25 MG/DL
POC CHOLESTEROL, LDL (DOCK): 144 MG/DL
POC CHOLESTEROL, TOTAL: 198 MG/DL
POC GLUCOSE, FASTING: 114 MG/DL (ref 60–110)
TRIGL SERPL-MCNC: 156 MG/DL

## 2025-04-10 ENCOUNTER — RESULTS FOLLOW-UP (OUTPATIENT)
Dept: OTHER | Facility: CLINIC | Age: 60
End: 2025-04-10